# Patient Record
Sex: FEMALE | Race: WHITE | NOT HISPANIC OR LATINO | Employment: FULL TIME | URBAN - METROPOLITAN AREA
[De-identification: names, ages, dates, MRNs, and addresses within clinical notes are randomized per-mention and may not be internally consistent; named-entity substitution may affect disease eponyms.]

---

## 2021-02-26 ENCOUNTER — OFFICE VISIT (OUTPATIENT)
Dept: SURGERY | Facility: CLINIC | Age: 26
End: 2021-02-26
Attending: COLON & RECTAL SURGERY
Payer: COMMERCIAL

## 2021-02-26 ENCOUNTER — TELEPHONE (OUTPATIENT)
Dept: SURGERY | Facility: CLINIC | Age: 26
End: 2021-02-26

## 2021-02-26 VITALS
DIASTOLIC BLOOD PRESSURE: 66 MMHG | BODY MASS INDEX: 31.09 KG/M2 | WEIGHT: 164.69 LBS | HEIGHT: 61 IN | SYSTOLIC BLOOD PRESSURE: 127 MMHG | HEART RATE: 89 BPM

## 2021-02-26 DIAGNOSIS — K51.914 ULCERATIVE COLITIS WITH ABSCESS, UNSPECIFIED LOCATION: Primary | ICD-10-CM

## 2021-02-26 PROCEDURE — 99999 PR PBB SHADOW E&M-NEW PATIENT-LVL III: CPT | Mod: PBBFAC,,, | Performed by: COLON & RECTAL SURGERY

## 2021-02-26 PROCEDURE — 99204 PR OFFICE/OUTPT VISIT, NEW, LEVL IV, 45-59 MIN: ICD-10-PCS | Mod: S$GLB,,, | Performed by: COLON & RECTAL SURGERY

## 2021-02-26 PROCEDURE — 99204 OFFICE O/P NEW MOD 45 MIN: CPT | Mod: S$GLB,,, | Performed by: COLON & RECTAL SURGERY

## 2021-02-26 PROCEDURE — 3008F BODY MASS INDEX DOCD: CPT | Mod: CPTII,S$GLB,, | Performed by: COLON & RECTAL SURGERY

## 2021-02-26 PROCEDURE — 1126F AMNT PAIN NOTED NONE PRSNT: CPT | Mod: S$GLB,,, | Performed by: COLON & RECTAL SURGERY

## 2021-02-26 PROCEDURE — 99999 PR PBB SHADOW E&M-NEW PATIENT-LVL III: ICD-10-PCS | Mod: PBBFAC,,, | Performed by: COLON & RECTAL SURGERY

## 2021-02-26 PROCEDURE — 3008F PR BODY MASS INDEX (BMI) DOCUMENTED: ICD-10-PCS | Mod: CPTII,S$GLB,, | Performed by: COLON & RECTAL SURGERY

## 2021-02-26 PROCEDURE — 1126F PR PAIN SEVERITY QUANTIFIED, NO PAIN PRESENT: ICD-10-PCS | Mod: S$GLB,,, | Performed by: COLON & RECTAL SURGERY

## 2021-02-26 RX ORDER — CITALOPRAM 40 MG/1
40 TABLET, FILM COATED ORAL DAILY
COMMUNITY
End: 2022-11-14 | Stop reason: SDUPTHER

## 2021-02-27 DIAGNOSIS — K51.914 ULCERATIVE COLITIS WITH ABSCESS, UNSPECIFIED LOCATION: Primary | ICD-10-CM

## 2021-03-01 ENCOUNTER — HOSPITAL ENCOUNTER (OUTPATIENT)
Dept: RADIOLOGY | Facility: HOSPITAL | Age: 26
Discharge: HOME | End: 2021-03-01
Attending: COLON & RECTAL SURGERY
Payer: COMMERCIAL

## 2021-03-01 DIAGNOSIS — K51.914 ULCERATIVE COLITIS WITH ABSCESS, UNSPECIFIED LOCATION: ICD-10-CM

## 2021-03-02 ENCOUNTER — OFFICE VISIT (OUTPATIENT)
Dept: WOUND CARE | Facility: CLINIC | Age: 26
End: 2021-03-02
Payer: COMMERCIAL

## 2021-03-02 ENCOUNTER — PATIENT MESSAGE (OUTPATIENT)
Dept: SURGERY | Facility: CLINIC | Age: 26
End: 2021-03-02

## 2021-03-02 DIAGNOSIS — Z71.89 ENCOUNTER FOR OSTOMY CARE EDUCATION: ICD-10-CM

## 2021-03-02 DIAGNOSIS — Z43.2 ATTENTION TO ILEOSTOMY: Primary | ICD-10-CM

## 2021-03-02 PROCEDURE — 99999 PR PBB SHADOW E&M-EST. PATIENT-LVL II: CPT | Mod: PBBFAC,,, | Performed by: CLINICAL NURSE SPECIALIST

## 2021-03-02 PROCEDURE — 99999 PR PBB SHADOW E&M-EST. PATIENT-LVL II: ICD-10-PCS | Mod: PBBFAC,,, | Performed by: CLINICAL NURSE SPECIALIST

## 2021-03-02 PROCEDURE — 99214 OFFICE O/P EST MOD 30 MIN: CPT | Mod: S$GLB,,, | Performed by: CLINICAL NURSE SPECIALIST

## 2021-03-02 PROCEDURE — 99214 PR OFFICE/OUTPT VISIT, EST, LEVL IV, 30-39 MIN: ICD-10-PCS | Mod: S$GLB,,, | Performed by: CLINICAL NURSE SPECIALIST

## 2021-03-13 ENCOUNTER — HOSPITAL ENCOUNTER (OUTPATIENT)
Dept: RADIOLOGY | Facility: HOSPITAL | Age: 26
Discharge: HOME | End: 2021-03-13
Attending: COLON & RECTAL SURGERY
Payer: COMMERCIAL

## 2021-03-13 PROCEDURE — 25500020 PHARM REV CODE 255: Performed by: COLON & RECTAL SURGERY

## 2021-03-13 PROCEDURE — 74177 CT ABD & PELVIS W/CONTRAST: CPT | Mod: 26,,, | Performed by: RADIOLOGY

## 2021-03-13 PROCEDURE — 74177 CT ENTEROGRAPHY ABD_PELVIS WITH CONTRAST: ICD-10-PCS | Mod: 26,,, | Performed by: RADIOLOGY

## 2021-03-13 PROCEDURE — 74177 CT ABD & PELVIS W/CONTRAST: CPT | Mod: TC

## 2021-03-13 RX ADMIN — IOHEXOL 125 ML: 350 INJECTION, SOLUTION INTRAVENOUS at 08:03

## 2021-03-23 ENCOUNTER — TELEPHONE (OUTPATIENT)
Dept: SURGERY | Facility: CLINIC | Age: 26
End: 2021-03-23

## 2021-04-09 ENCOUNTER — OFFICE VISIT (OUTPATIENT)
Dept: SURGERY | Facility: CLINIC | Age: 26
End: 2021-04-09
Attending: COLON & RECTAL SURGERY
Payer: COMMERCIAL

## 2021-04-09 VITALS
HEART RATE: 78 BPM | DIASTOLIC BLOOD PRESSURE: 65 MMHG | WEIGHT: 164 LBS | HEIGHT: 61 IN | SYSTOLIC BLOOD PRESSURE: 104 MMHG | BODY MASS INDEX: 30.96 KG/M2

## 2021-04-09 DIAGNOSIS — K51.914 ULCERATIVE COLITIS WITH ABSCESS, UNSPECIFIED LOCATION: Primary | ICD-10-CM

## 2021-04-09 PROCEDURE — 1126F PR PAIN SEVERITY QUANTIFIED, NO PAIN PRESENT: ICD-10-PCS | Mod: S$GLB,,, | Performed by: COLON & RECTAL SURGERY

## 2021-04-09 PROCEDURE — 99212 OFFICE O/P EST SF 10 MIN: CPT | Mod: S$GLB,,, | Performed by: COLON & RECTAL SURGERY

## 2021-04-09 PROCEDURE — 3008F PR BODY MASS INDEX (BMI) DOCUMENTED: ICD-10-PCS | Mod: CPTII,S$GLB,, | Performed by: COLON & RECTAL SURGERY

## 2021-04-09 PROCEDURE — 99999 PR PBB SHADOW E&M-EST. PATIENT-LVL III: ICD-10-PCS | Mod: PBBFAC,,, | Performed by: COLON & RECTAL SURGERY

## 2021-04-09 PROCEDURE — 3008F BODY MASS INDEX DOCD: CPT | Mod: CPTII,S$GLB,, | Performed by: COLON & RECTAL SURGERY

## 2021-04-09 PROCEDURE — 1126F AMNT PAIN NOTED NONE PRSNT: CPT | Mod: S$GLB,,, | Performed by: COLON & RECTAL SURGERY

## 2021-04-09 PROCEDURE — 99999 PR PBB SHADOW E&M-EST. PATIENT-LVL III: CPT | Mod: PBBFAC,,, | Performed by: COLON & RECTAL SURGERY

## 2021-04-09 PROCEDURE — 99212 PR OFFICE/OUTPT VISIT, EST, LEVL II, 10-19 MIN: ICD-10-PCS | Mod: S$GLB,,, | Performed by: COLON & RECTAL SURGERY

## 2021-04-14 ENCOUNTER — TELEPHONE (OUTPATIENT)
Dept: SURGERY | Facility: CLINIC | Age: 26
End: 2021-04-14

## 2021-04-16 ENCOUNTER — PATIENT MESSAGE (OUTPATIENT)
Dept: RESEARCH | Facility: HOSPITAL | Age: 26
End: 2021-04-16

## 2021-04-26 ENCOUNTER — DOCUMENTATION ONLY (OUTPATIENT)
Dept: SURGERY | Facility: CLINIC | Age: 26
End: 2021-04-26

## 2022-01-25 ENCOUNTER — PATIENT MESSAGE (OUTPATIENT)
Dept: SURGERY | Facility: CLINIC | Age: 27
End: 2022-01-25
Payer: COMMERCIAL

## 2022-01-25 DIAGNOSIS — Z71.3 WEIGHT LOSS COUNSELING, ENCOUNTER FOR: Primary | ICD-10-CM

## 2022-01-26 ENCOUNTER — TELEPHONE (OUTPATIENT)
Dept: BARIATRICS | Facility: CLINIC | Age: 27
End: 2022-01-26
Payer: COMMERCIAL

## 2022-01-26 NOTE — TELEPHONE ENCOUNTER
Phoned patient in reference to the referral received from her colon surgeon.  Left detailed message regarding a FC appointment to verify medical weight loss coverage and get her set up with a bariatrician, both female.

## 2022-01-27 ENCOUNTER — TELEPHONE (OUTPATIENT)
Dept: BARIATRICS | Facility: CLINIC | Age: 27
End: 2022-01-27
Payer: COMMERCIAL

## 2022-01-27 NOTE — TELEPHONE ENCOUNTER
----- Message from Brown Guzman RN sent at 1/25/2022  9:33 AM CST -----  Regarding: Referral  Good morning all,  Would you please schedule patient to see one of your providers? Referral is in: weight loss.   Thank you,    ~Rajesh

## 2022-03-22 ENCOUNTER — OFFICE VISIT (OUTPATIENT)
Dept: DERMATOLOGY | Facility: CLINIC | Age: 27
End: 2022-03-22
Payer: COMMERCIAL

## 2022-03-22 DIAGNOSIS — L03.90 CELLULITIS, UNSPECIFIED CELLULITIS SITE: Primary | ICD-10-CM

## 2022-03-22 PROCEDURE — 1160F PR REVIEW ALL MEDS BY PRESCRIBER/CLIN PHARMACIST DOCUMENTED: ICD-10-PCS | Mod: CPTII,95,, | Performed by: STUDENT IN AN ORGANIZED HEALTH CARE EDUCATION/TRAINING PROGRAM

## 2022-03-22 PROCEDURE — 1159F PR MEDICATION LIST DOCUMENTED IN MEDICAL RECORD: ICD-10-PCS | Mod: CPTII,95,, | Performed by: STUDENT IN AN ORGANIZED HEALTH CARE EDUCATION/TRAINING PROGRAM

## 2022-03-22 PROCEDURE — 1160F RVW MEDS BY RX/DR IN RCRD: CPT | Mod: CPTII,95,, | Performed by: STUDENT IN AN ORGANIZED HEALTH CARE EDUCATION/TRAINING PROGRAM

## 2022-03-22 PROCEDURE — 1159F MED LIST DOCD IN RCRD: CPT | Mod: CPTII,95,, | Performed by: STUDENT IN AN ORGANIZED HEALTH CARE EDUCATION/TRAINING PROGRAM

## 2022-03-22 PROCEDURE — 99203 PR OFFICE/OUTPT VISIT, NEW, LEVL III, 30-44 MIN: ICD-10-PCS | Mod: 95,,, | Performed by: STUDENT IN AN ORGANIZED HEALTH CARE EDUCATION/TRAINING PROGRAM

## 2022-03-22 PROCEDURE — 99203 OFFICE O/P NEW LOW 30 MIN: CPT | Mod: 95,,, | Performed by: STUDENT IN AN ORGANIZED HEALTH CARE EDUCATION/TRAINING PROGRAM

## 2022-03-22 RX ORDER — DOXYCYCLINE HYCLATE 100 MG
100 TABLET ORAL EVERY 12 HOURS
Qty: 20 TABLET | Refills: 0 | Status: SHIPPED | OUTPATIENT
Start: 2022-03-22 | End: 2022-04-01

## 2022-03-22 NOTE — PROGRESS NOTES
Patient Information  Name: Lorelei Rodriguez  : 1995  MRN: [id number]     Referring Physician:  Dr. Hinton   Primary Care Physician:  Dr. Batool Hinton MD   Date of Visit: 2022      Subjective:       Lorelei Rodriguez is a 26 y.o. female who presents for rash    HPI  The patient location is: [place]  The chief complaint leading to consultation is: rash    Visit type: audiovisual    Face to Face time with patient: 10 min  12 minutes of total time spent on the encounter, which includes face to face time and non-face to face time preparing to see the patient (eg, review of tests), Obtaining and/or reviewing separately obtained history, Documenting clinical information in the electronic or other health record, Independently interpreting results (not separately reported) and communicating results to the patient/family/caregiver, or Care coordination (not separately reported).         Each patient to whom he or she provides medical services by telemedicine is:  (1) informed of the relationship between the physician and patient and the respective role of any other health care provider with respect to management of the patient; and (2) notified that he or she may decline to receive medical services by telemedicine and may withdraw from such care at any time.    Notes:   Patient with new complaint of lesion(s)  Location: right leg  Duration: 1 week  Symptoms: raised, warm to touch  Relieving factors/Previous treatments: none    Patient was last seen:Visit date not found     Prior notes by myself reviewed.   Clinical documentation obtained by nursing staff reviewed.    Review of Systems   Constitutional: Negative for fever and chills.   Skin: Positive for rash. Negative for itching.        Objective:    Physical Exam   Constitutional: She appears well-developed and well-nourished. No distress.   Neurological: She is alert and oriented to person, place, and time. She is not disoriented.   Psychiatric: She has a normal mood  and affect.   Skin:   Areas Examined (abnormalities noted in diagram):   RUE Inspected              Diagram Legend     Erythematous scaling macule/papule c/w actinic keratosis       Vascular papule c/w angioma      Pigmented verrucoid papule/plaque c/w seborrheic keratosis      Yellow umbilicated papule c/w sebaceous hyperplasia      Irregularly shaped tan macule c/w lentigo     1-2 mm smooth white papules consistent with Milia      Movable subcutaneous cyst with punctum c/w epidermal inclusion cyst      Subcutaneous movable cyst c/w pilar cyst      Firm pink to brown papule c/w dermatofibroma      Pedunculated fleshy papule(s) c/w skin tag(s)      Evenly pigmented macule c/w junctional nevus     Mildly variegated pigmented, slightly irregular-bordered macule c/w mildly atypical nevus      Flesh colored to evenly pigmented papule c/w intradermal nevus       Pink pearly papule/plaque c/w basal cell carcinoma      Erythematous hyperkeratotic cursted plaque c/w SCC      Surgical scar with no sign of skin cancer recurrence      Open and closed comedones      Inflammatory papules and pustules      Verrucoid papule consistent consistent with wart     Erythematous eczematous patches and plaques     Dystrophic onycholytic nail with subungual debris c/w onychomycosis     Umbilicated papule    Erythematous-base heme-crusted tan verrucoid plaque consistent with inflamed seborrheic keratosis     Erythematous Silvery Scaling Plaque c/w Psoriasis     See annotation            [] Data reviewed  [] Independent review of test  [] Management discussed with another provider    Assessment / Plan:        Cellulitis, unspecified cellulitis site  -     doxycycline (VIBRA-TABS) 100 MG tablet; Take 1 tablet (100 mg total) by mouth every 12 (twelve) hours. for 10 days  Dispense: 20 tablet; Refill: 0  Discussed benefits and risks of doxycyline therapy including but not limited to GI discomfort, esophageal irritation/ulceration, and increased  sun sensitivity. Patient was counseled to take medicine with meals and at least 1 hour before lying down.            LOS NUMBER AND COMPLEXITY OF PROBLEMS    COMPLEXITY OF DATA RISK TOTAL TIME (m)   21786  02764 [] 1 self-limited or minor problem [x] Minimal to none [] No treatment recommended or patient to monitor 15-29  10-19   13129  56201 Low  [] 2 or > self limited or minor problems  [] 1 stable chronic illness  [x] 1 acute, uncomplicated illness or injury Limited (2)  [] Prior external notes from each unique source  [] Review result of each unique test  [] Order each unique test []  Low  OTC medications, minor skin biopsy 30-44  20-29   65166  19854 Moderate  []  1 or > chronic illness with progression, exacerbation or SE of treatment  []  2 or more stable chronic illnesses  []  1 acute illness with systemic symptoms  []  1 acute complicated injury  []  1 undiagnosed new problem with uncertain prognosis Moderate (1/3 below)  []  3 or more data items        *Now includes assessment requiring independent historian  []  Independent interpretation of a test  []  Discuss management/test with another provider Moderate  [x]  Prescription drug mgmt  []  Minor surgery with risk discussed  []  Mgmt limited by social determinates 45-59  30-39   09407  39152 High  []  1 or more chronic illness with severe exacerbation, progression or SE of treatment  []  1 acute or chronic illness/injury that poses a threat to life or bodily function Extensive (2/3 below)  []  3 or more data items        *Now includes assessment requiring independent historian.  []  Independent interpretation of a test  []  Discuss management/test with another provider High  []  Major surgery with risk discussed  []  Drug therapy requiring intensive monitoring for toxicity  []  Hospitalization  []  Decision for DNR 60-74  40-54      No follow-ups on file.    Nadja Ayala MD, FAAD  [facility]

## 2022-04-02 ENCOUNTER — HOSPITAL ENCOUNTER (EMERGENCY)
Facility: HOSPITAL | Age: 27
Discharge: HOME | End: 2022-04-02
Attending: EMERGENCY MEDICINE
Payer: COMMERCIAL

## 2022-04-02 VITALS
TEMPERATURE: 98 F | SYSTOLIC BLOOD PRESSURE: 111 MMHG | WEIGHT: 160 LBS | HEIGHT: 61 IN | BODY MASS INDEX: 30.21 KG/M2 | DIASTOLIC BLOOD PRESSURE: 78 MMHG | RESPIRATION RATE: 18 BRPM | OXYGEN SATURATION: 99 % | HEART RATE: 84 BPM

## 2022-04-02 DIAGNOSIS — L01.00 IMPETIGO: ICD-10-CM

## 2022-04-02 DIAGNOSIS — R21 RASH: Primary | ICD-10-CM

## 2022-04-02 LAB
B-HCG UR QL: NEGATIVE
CTP QC/QA: YES

## 2022-04-02 PROCEDURE — 25000003 PHARM REV CODE 250: Mod: ER | Performed by: EMERGENCY MEDICINE

## 2022-04-02 PROCEDURE — 99283 EMERGENCY DEPT VISIT LOW MDM: CPT | Mod: 25,ER

## 2022-04-02 PROCEDURE — 81025 URINE PREGNANCY TEST: CPT | Mod: ER | Performed by: EMERGENCY MEDICINE

## 2022-04-02 RX ORDER — MUPIROCIN 20 MG/G
OINTMENT TOPICAL 2 TIMES DAILY
Qty: 22 G | Refills: 0 | Status: ON HOLD | OUTPATIENT
Start: 2022-04-02 | End: 2022-04-13 | Stop reason: HOSPADM

## 2022-04-02 RX ORDER — AMOXICILLIN AND CLAVULANATE POTASSIUM 875; 125 MG/1; MG/1
1 TABLET, FILM COATED ORAL 2 TIMES DAILY
Qty: 20 TABLET | Refills: 0 | Status: ON HOLD | OUTPATIENT
Start: 2022-04-02 | End: 2022-04-13 | Stop reason: HOSPADM

## 2022-04-02 RX ORDER — MUPIROCIN 20 MG/G
OINTMENT TOPICAL
Status: COMPLETED | OUTPATIENT
Start: 2022-04-02 | End: 2022-04-02

## 2022-04-02 RX ORDER — KETOROLAC TROMETHAMINE 10 MG/1
10 TABLET, FILM COATED ORAL EVERY 6 HOURS PRN
Qty: 12 TABLET | Refills: 0 | Status: SHIPPED | OUTPATIENT
Start: 2022-04-02 | End: 2022-04-05

## 2022-04-02 RX ADMIN — MUPIROCIN: 20 OINTMENT TOPICAL at 01:04

## 2022-04-02 NOTE — ED PROVIDER NOTES
Encounter Date: 4/2/2022       History     Chief Complaint   Patient presents with    Skin Problem     PT WITH RED SWOLLEN PAINFUL AREA TO RIGHT SHIN FOR 1 WEEK, REPORTS HAD VIRTUAL VISIT WITH DERMATOLOGIST LAST Wednesday AND PLACED ON ABX, REPORTS NO BETTER     26 y.o. female with ulcerative colitis (status post ileostomy), pyoderma gangrenosa presents emergency department complaining of acute redness, swelling pain to right anterior shin that began a week ago. She states symptoms are similar to previous episodes of pyoderma gangrenosa. She denies fever, SOB, CP or gait disturbance. She reports seeing dermatologist last week (11 days ago according to chart review) and was prescribed 10 day course of doxycycline. She states area increasing in size since and becoming more painful starting antibiotics and has 4 days of antibiotics remaining as of today. She reports shaving her legs 2-3 days ago. States she was told to follow up with dermatologist if symptoms do not improve, but she has not notified her dermatologist of her worsening symptoms yet.     The history is provided by the patient.     Review of patient's allergies indicates:   Allergen Reactions    Nickel sutures [surgical stainless steel]     Nickel Rash     Past Medical History:   Diagnosis Date    Pyoderma gangrenosa     UC (ulcerative colitis)      Past Surgical History:   Procedure Laterality Date    ILEOSTOMY      SINUS SURGERY       No family history on file.  Social History     Tobacco Use    Smoking status: Never Smoker    Smokeless tobacco: Never Used   Substance Use Topics    Alcohol use: Yes     Comment: RARELY    Drug use: Never     Review of Systems   Constitutional: Negative for fever.   HENT: Negative for trouble swallowing.    Respiratory: Negative for shortness of breath.    Cardiovascular: Negative for chest pain and leg swelling.   Gastrointestinal: Negative for nausea and vomiting.   Skin: Positive for rash.   Neurological:  Negative for weakness and numbness.   All other systems reviewed and are negative.      Physical Exam     Initial Vitals [22 0040]   BP Pulse Resp Temp SpO2   111/78 81 20 98.3 °F (36.8 °C) 100 %      MAP       --         Physical Exam    Nursing note and vitals reviewed.  Constitutional: She appears well-developed and well-nourished. She is not diaphoretic. No distress.   HENT:   Head: Normocephalic and atraumatic.   Eyes: Conjunctivae are normal. Pupils are equal, round, and reactive to light.   Neck: Neck supple.   Normal range of motion.  Cardiovascular: Normal rate and intact distal pulses.   Pulmonary/Chest: No accessory muscle usage. No tachypnea. No respiratory distress.   Abdominal: She exhibits no distension. There is no abdominal tenderness.   Musculoskeletal:         General: No tenderness. Normal range of motion.      Cervical back: Normal range of motion and neck supple.     Neurological: She is alert and oriented to person, place, and time. She has normal strength. Gait normal. GCS eye subscore is 4. GCS verbal subscore is 5. GCS motor subscore is 6.   Skin: Skin is warm. Capillary refill takes less than 2 seconds. Rash noted.   Psychiatric: She has a normal mood and affect.             ED Course   Procedures  Labs Reviewed   POCT URINE PREGNANCY          Imaging Results    None          Medications   mupirocin 2 % ointment ( Topical (Top) Given 22)                          Clinical Impression:   Final diagnoses:  [R21] Rash (Primary)  [L01.00] Impetigo          ED Disposition Condition    Discharge Stable        ED Prescriptions     Medication Sig Dispense Start Date End Date Auth. Provider    amoxicillin-clavulanate 875-125mg (AUGMENTIN) 875-125 mg per tablet () Take 1 tablet by mouth 2 (two) times daily. for 10 days 20 tablet 2022 Mk Bonner MD    mupirocin (BACTROBAN) 2 % ointment () Apply topically 2 (two) times daily. for 10 days 22 g 2022  2022 Mk Bonner MD    ketorolac (TORADOL) 10 mg tablet () Take 1 tablet (10 mg total) by mouth every 6 (six) hours as needed for Pain (take with food). 12 tablet 2022 Mk Bonner MD        Follow-up Information     Follow up With Specialties Details Why Contact Info    Batool Hinton MD Internal Medicine Call  Monday morning, to schedule an appointment, for re-evaluation of today's complaint, and ongoing care [address] [place] [id number]  [phone]      The nearest emergency department.  Go to  As needed, If symptoms worsen            Mk Bonner MD  22

## 2022-04-07 ENCOUNTER — HOSPITAL ENCOUNTER (INPATIENT)
Facility: HOSPITAL | Age: 27
LOS: 7 days | Discharge: HOME | DRG: 871 | End: 2022-04-14
Attending: INTERNAL MEDICINE | Admitting: INTERNAL MEDICINE
Payer: COMMERCIAL

## 2022-04-07 DIAGNOSIS — L88 PYODERMA GANGRENOSUM: Chronic | ICD-10-CM

## 2022-04-07 DIAGNOSIS — Z16.20 THERAPY FAILURE DUE TO ANTIBIOTIC RESISTANCE: ICD-10-CM

## 2022-04-07 DIAGNOSIS — K51.918 ULCERATIVE COLITIS WITH OTHER COMPLICATION, UNSPECIFIED LOCATION: ICD-10-CM

## 2022-04-07 DIAGNOSIS — A41.9 SEPSIS, DUE TO UNSPECIFIED ORGANISM, UNSPECIFIED WHETHER ACUTE ORGAN DYSFUNCTION PRESENT: ICD-10-CM

## 2022-04-07 DIAGNOSIS — K51.818 OTHER ULCERATIVE COLITIS WITH OTHER COMPLICATION: ICD-10-CM

## 2022-04-07 DIAGNOSIS — L03.115 CELLULITIS OF RIGHT LEG: Primary | ICD-10-CM

## 2022-04-07 PROBLEM — R65.21 SEPTIC SHOCK: Status: ACTIVE | Noted: 2022-04-07

## 2022-04-07 PROBLEM — K51.90 ULCERATIVE COLITIS: Status: ACTIVE | Noted: 2018-11-28

## 2022-04-07 LAB
ALBUMIN SERPL-MCNC: 3.2 G/DL (ref 3.3–5.5)
ALLENS TEST: ABNORMAL
ALP SERPL-CCNC: 81 U/L (ref 42–141)
BILIRUB SERPL-MCNC: 0.8 MG/DL (ref 0.2–1.6)
BUN SERPL-MCNC: 10 MG/DL (ref 7–22)
CALCIUM SERPL-MCNC: 9.4 MG/DL (ref 8–10.3)
CHLORIDE SERPL-SCNC: 104 MMOL/L (ref 98–108)
CREAT SERPL-MCNC: 0.6 MG/DL (ref 0.6–1.2)
CTP QC/QA: YES
GLUCOSE SERPL-MCNC: 99 MG/DL (ref 73–118)
HCO3 UR-SCNC: 24.4 MMOL/L (ref 24–28)
LDH SERPL L TO P-CCNC: 2.51 MMOL/L (ref 0.5–2.2)
PCO2 BLDA: 46.7 MMHG (ref 35–45)
PH SMN: 7.33 [PH] (ref 7.35–7.45)
PO2 BLDA: 45 MMHG (ref 40–60)
POC ALT (SGPT): 19 U/L (ref 10–47)
POC AST (SGOT): 29 U/L (ref 11–38)
POC BE: -2 MMOL/L
POC SATURATED O2: 77 % (ref 95–100)
POC TCO2: 25 MMOL/L (ref 18–33)
POC TCO2: 26 MMOL/L (ref 24–29)
POTASSIUM BLD-SCNC: 3.7 MMOL/L (ref 3.6–5.1)
PROTEIN, POC: 7.7 G/DL (ref 6.4–8.1)
SAMPLE: ABNORMAL
SARS-COV-2 RDRP RESP QL NAA+PROBE: NEGATIVE
SITE: ABNORMAL
SODIUM BLD-SCNC: 138 MMOL/L (ref 128–145)

## 2022-04-07 PROCEDURE — 96366 THER/PROPH/DIAG IV INF ADDON: CPT | Mod: ER

## 2022-04-07 PROCEDURE — U0002 COVID-19 LAB TEST NON-CDC: HCPCS | Mod: ER | Performed by: INTERNAL MEDICINE

## 2022-04-07 PROCEDURE — 80053 COMPREHEN METABOLIC PANEL: CPT | Mod: ER

## 2022-04-07 PROCEDURE — 82803 BLOOD GASES ANY COMBINATION: CPT | Mod: ER

## 2022-04-07 PROCEDURE — 85025 COMPLETE CBC W/AUTO DIFF WBC: CPT | Mod: ER

## 2022-04-07 PROCEDURE — 96365 THER/PROPH/DIAG IV INF INIT: CPT | Mod: ER

## 2022-04-07 PROCEDURE — 20000000 HC ICU ROOM

## 2022-04-07 PROCEDURE — 99291 CRITICAL CARE FIRST HOUR: CPT | Mod: 25,ER

## 2022-04-07 PROCEDURE — 87040 BLOOD CULTURE FOR BACTERIA: CPT | Performed by: INTERNAL MEDICINE

## 2022-04-07 PROCEDURE — 96367 TX/PROPH/DG ADDL SEQ IV INF: CPT | Mod: ER

## 2022-04-07 PROCEDURE — 25000003 PHARM REV CODE 250: Mod: ER | Performed by: INTERNAL MEDICINE

## 2022-04-07 PROCEDURE — 63600175 PHARM REV CODE 636 W HCPCS: Mod: ER | Performed by: INTERNAL MEDICINE

## 2022-04-07 RX ORDER — IBUPROFEN 600 MG/1
600 TABLET ORAL
Status: COMPLETED | OUTPATIENT
Start: 2022-04-07 | End: 2022-04-07

## 2022-04-07 RX ORDER — VANCOMYCIN HCL IN 5 % DEXTROSE 1G/250ML
1000 PLASTIC BAG, INJECTION (ML) INTRAVENOUS
Status: COMPLETED | OUTPATIENT
Start: 2022-04-07 | End: 2022-04-07

## 2022-04-07 RX ORDER — SODIUM CHLORIDE 9 MG/ML
1000 INJECTION, SOLUTION INTRAVENOUS ONCE
Status: COMPLETED | OUTPATIENT
Start: 2022-04-07 | End: 2022-04-07

## 2022-04-07 RX ORDER — ACETAMINOPHEN 500 MG
1000 TABLET ORAL
Status: COMPLETED | OUTPATIENT
Start: 2022-04-07 | End: 2022-04-07

## 2022-04-07 RX ADMIN — SODIUM CHLORIDE 1000 ML: 0.9 INJECTION, SOLUTION INTRAVENOUS at 09:04

## 2022-04-07 RX ADMIN — VANCOMYCIN HYDROCHLORIDE 1000 MG: 1 INJECTION, POWDER, LYOPHILIZED, FOR SOLUTION INTRAVENOUS at 08:04

## 2022-04-07 RX ADMIN — SODIUM CHLORIDE 1000 ML: 0.9 INJECTION, SOLUTION INTRAVENOUS at 10:04

## 2022-04-07 RX ADMIN — ACETAMINOPHEN 1000 MG: 500 TABLET ORAL at 07:04

## 2022-04-07 RX ADMIN — IBUPROFEN 600 MG: 600 TABLET ORAL at 08:04

## 2022-04-07 RX ADMIN — SODIUM CHLORIDE 1000 ML: 0.9 INJECTION, SOLUTION INTRAVENOUS at 08:04

## 2022-04-07 RX ADMIN — PIPERACILLIN AND TAZOBACTAM 4.5 G: 4; .5 INJECTION, POWDER, LYOPHILIZED, FOR SOLUTION INTRAVENOUS; PARENTERAL at 08:04

## 2022-04-07 NOTE — Clinical Note
Diagnosis: Cellulitis of right leg [253976]   Admitting Provider:: ANKITA RINALDI [[id number]]   Future Attending Provider: JENN RICHMOND [[id number]]   Reason for IP Medical Treatment  (Clinical interventions that can only be accomplished in the IP setting? ) :: Further treatment for right lower extremity cellulitis   Estimated Length of Stay:: 2 midnights   I certify that Inpatient services for greater than or equal to 2 midnights are medically necessary:: Yes   Plans for Post-Acute care--if anticipated (pick the single best option):: A. No post acute care anticipated at this time   Special Needs:: No Special Needs [1]

## 2022-04-08 LAB
ALBUMIN SERPL BCP-MCNC: 2.5 G/DL (ref 3.5–5.2)
ALLENS TEST: ABNORMAL
ALP SERPL-CCNC: 63 U/L (ref 55–135)
ALT SERPL W/O P-5'-P-CCNC: 17 U/L (ref 10–44)
ANION GAP SERPL CALC-SCNC: 6 MMOL/L (ref 8–16)
AST SERPL-CCNC: 17 U/L (ref 10–40)
BASOPHILS # BLD AUTO: 0.06 K/UL (ref 0–0.2)
BASOPHILS NFR BLD: 0.5 % (ref 0–1.9)
BILIRUB SERPL-MCNC: 1 MG/DL (ref 0.1–1)
BUN SERPL-MCNC: 6 MG/DL (ref 6–20)
CALCIUM SERPL-MCNC: 7.8 MG/DL (ref 8.7–10.5)
CHLORIDE SERPL-SCNC: 113 MMOL/L (ref 95–110)
CO2 SERPL-SCNC: 18 MMOL/L (ref 23–29)
CREAT SERPL-MCNC: 0.6 MG/DL (ref 0.5–1.4)
DIFFERENTIAL METHOD: ABNORMAL
EOSINOPHIL # BLD AUTO: 0.5 K/UL (ref 0–0.5)
EOSINOPHIL NFR BLD: 3.9 % (ref 0–8)
ERYTHROCYTE [DISTWIDTH] IN BLOOD BY AUTOMATED COUNT: 12.5 % (ref 11.5–14.5)
EST. GFR  (AFRICAN AMERICAN): >60 ML/MIN/1.73 M^2
EST. GFR  (NON AFRICAN AMERICAN): >60 ML/MIN/1.73 M^2
GLUCOSE SERPL-MCNC: 85 MG/DL (ref 70–110)
HCO3 UR-SCNC: 19.3 MMOL/L (ref 24–28)
HCT VFR BLD AUTO: 38.1 % (ref 37–48.5)
HGB BLD-MCNC: 12.4 G/DL (ref 12–16)
IMM GRANULOCYTES # BLD AUTO: 0.03 K/UL (ref 0–0.04)
IMM GRANULOCYTES NFR BLD AUTO: 0.3 % (ref 0–0.5)
LACTATE SERPL-SCNC: 0.9 MMOL/L (ref 0.5–2.2)
LDH SERPL L TO P-CCNC: 1.22 MMOL/L (ref 0.5–2.2)
LYMPHOCYTES # BLD AUTO: 1.9 K/UL (ref 1–4.8)
LYMPHOCYTES NFR BLD: 16.4 % (ref 18–48)
MCH RBC QN AUTO: 27.7 PG (ref 27–31)
MCHC RBC AUTO-ENTMCNC: 32.5 G/DL (ref 32–36)
MCV RBC AUTO: 85 FL (ref 82–98)
MONOCYTES # BLD AUTO: 1.2 K/UL (ref 0.3–1)
MONOCYTES NFR BLD: 10.1 % (ref 4–15)
NEUTROPHILS # BLD AUTO: 8.2 K/UL (ref 1.8–7.7)
NEUTROPHILS NFR BLD: 68.8 % (ref 38–73)
NRBC BLD-RTO: 0 /100 WBC
PCO2 BLDA: 33.3 MMHG (ref 35–45)
PH SMN: 7.37 [PH] (ref 7.35–7.45)
PLATELET # BLD AUTO: 247 K/UL (ref 150–450)
PMV BLD AUTO: 9.5 FL (ref 9.2–12.9)
PO2 BLDA: 82 MMHG (ref 40–60)
POC BE: -5 MMOL/L
POC SATURATED O2: 96 % (ref 95–100)
POC TCO2: 20 MMOL/L (ref 24–29)
POTASSIUM SERPL-SCNC: 3.9 MMOL/L (ref 3.5–5.1)
PROT SERPL-MCNC: 5.8 G/DL (ref 6–8.4)
RBC # BLD AUTO: 4.48 M/UL (ref 4–5.4)
SAMPLE: ABNORMAL
SITE: ABNORMAL
SODIUM SERPL-SCNC: 137 MMOL/L (ref 136–145)
WBC # BLD AUTO: 11.84 K/UL (ref 3.9–12.7)

## 2022-04-08 PROCEDURE — 99222 PR INITIAL HOSPITAL CARE,LEVL II: ICD-10-PCS | Mod: ,,, | Performed by: INTERNAL MEDICINE

## 2022-04-08 PROCEDURE — 25000003 PHARM REV CODE 250: Performed by: INTERNAL MEDICINE

## 2022-04-08 PROCEDURE — 11000001 HC ACUTE MED/SURG PRIVATE ROOM

## 2022-04-08 PROCEDURE — 99222 1ST HOSP IP/OBS MODERATE 55: CPT | Mod: ,,, | Performed by: INTERNAL MEDICINE

## 2022-04-08 PROCEDURE — 36415 COLL VENOUS BLD VENIPUNCTURE: CPT | Performed by: INTERNAL MEDICINE

## 2022-04-08 PROCEDURE — 63600175 PHARM REV CODE 636 W HCPCS: Performed by: INTERNAL MEDICINE

## 2022-04-08 PROCEDURE — 86706 HEP B SURFACE ANTIBODY: CPT | Performed by: INTERNAL MEDICINE

## 2022-04-08 PROCEDURE — 86704 HEP B CORE ANTIBODY TOTAL: CPT | Performed by: INTERNAL MEDICINE

## 2022-04-08 PROCEDURE — 86790 VIRUS ANTIBODY NOS: CPT | Performed by: INTERNAL MEDICINE

## 2022-04-08 PROCEDURE — 86480 TB TEST CELL IMMUN MEASURE: CPT | Performed by: INTERNAL MEDICINE

## 2022-04-08 PROCEDURE — 82803 BLOOD GASES ANY COMBINATION: CPT | Mod: ER

## 2022-04-08 PROCEDURE — 86803 HEPATITIS C AB TEST: CPT | Performed by: INTERNAL MEDICINE

## 2022-04-08 PROCEDURE — 87340 HEPATITIS B SURFACE AG IA: CPT | Performed by: INTERNAL MEDICINE

## 2022-04-08 PROCEDURE — 80053 COMPREHEN METABOLIC PANEL: CPT | Performed by: INTERNAL MEDICINE

## 2022-04-08 PROCEDURE — 85025 COMPLETE CBC W/AUTO DIFF WBC: CPT | Performed by: INTERNAL MEDICINE

## 2022-04-08 PROCEDURE — 83605 ASSAY OF LACTIC ACID: CPT | Performed by: INTERNAL MEDICINE

## 2022-04-08 RX ORDER — DIPHENHYDRAMINE HCL 25 MG
25 CAPSULE ORAL EVERY 6 HOURS PRN
Status: DISCONTINUED | OUTPATIENT
Start: 2022-04-08 | End: 2022-04-14 | Stop reason: HOSPADM

## 2022-04-08 RX ORDER — MORPHINE SULFATE 4 MG/ML
4 INJECTION, SOLUTION INTRAMUSCULAR; INTRAVENOUS EVERY 6 HOURS PRN
Status: DISCONTINUED | OUTPATIENT
Start: 2022-04-08 | End: 2022-04-14 | Stop reason: HOSPADM

## 2022-04-08 RX ORDER — PROCHLORPERAZINE EDISYLATE 5 MG/ML
5 INJECTION INTRAMUSCULAR; INTRAVENOUS EVERY 6 HOURS PRN
Status: DISCONTINUED | OUTPATIENT
Start: 2022-04-08 | End: 2022-04-14 | Stop reason: HOSPADM

## 2022-04-08 RX ORDER — NOREPINEPHRINE BITARTRATE/D5W 4MG/250ML
0-.2 PLASTIC BAG, INJECTION (ML) INTRAVENOUS CONTINUOUS
Status: DISCONTINUED | OUTPATIENT
Start: 2022-04-08 | End: 2022-04-08

## 2022-04-08 RX ORDER — ACETAMINOPHEN 500 MG
500 TABLET ORAL EVERY 6 HOURS PRN
Status: DISCONTINUED | OUTPATIENT
Start: 2022-04-08 | End: 2022-04-08

## 2022-04-08 RX ORDER — TALC
6 POWDER (GRAM) TOPICAL NIGHTLY PRN
Status: DISCONTINUED | OUTPATIENT
Start: 2022-04-08 | End: 2022-04-14 | Stop reason: HOSPADM

## 2022-04-08 RX ORDER — CALCIUM CARBONATE 200(500)MG
500 TABLET,CHEWABLE ORAL DAILY PRN
Status: DISCONTINUED | OUTPATIENT
Start: 2022-04-08 | End: 2022-04-14 | Stop reason: HOSPADM

## 2022-04-08 RX ORDER — HYDROCODONE BITARTRATE AND ACETAMINOPHEN 5; 325 MG/1; MG/1
1 TABLET ORAL EVERY 4 HOURS PRN
Status: DISCONTINUED | OUTPATIENT
Start: 2022-04-08 | End: 2022-04-14 | Stop reason: HOSPADM

## 2022-04-08 RX ORDER — IBUPROFEN 400 MG/1
400 TABLET ORAL ONCE
Status: COMPLETED | OUTPATIENT
Start: 2022-04-08 | End: 2022-04-08

## 2022-04-08 RX ORDER — CITALOPRAM 20 MG/1
40 TABLET, FILM COATED ORAL DAILY
Status: DISCONTINUED | OUTPATIENT
Start: 2022-04-08 | End: 2022-04-14 | Stop reason: HOSPADM

## 2022-04-08 RX ORDER — SIMETHICONE 80 MG
1 TABLET,CHEWABLE ORAL 3 TIMES DAILY PRN
Status: DISCONTINUED | OUTPATIENT
Start: 2022-04-08 | End: 2022-04-14 | Stop reason: HOSPADM

## 2022-04-08 RX ORDER — CLONIDINE HYDROCHLORIDE 0.1 MG/1
0.1 TABLET ORAL 3 TIMES DAILY PRN
Status: DISCONTINUED | OUTPATIENT
Start: 2022-04-08 | End: 2022-04-09

## 2022-04-08 RX ORDER — ENOXAPARIN SODIUM 100 MG/ML
40 INJECTION SUBCUTANEOUS EVERY 24 HOURS
Status: DISCONTINUED | OUTPATIENT
Start: 2022-04-08 | End: 2022-04-08

## 2022-04-08 RX ORDER — MUPIROCIN 20 MG/G
OINTMENT TOPICAL 2 TIMES DAILY
Status: DISPENSED | OUTPATIENT
Start: 2022-04-08 | End: 2022-04-13

## 2022-04-08 RX ORDER — SODIUM CHLORIDE 9 MG/ML
INJECTION, SOLUTION INTRAVENOUS CONTINUOUS
Status: DISCONTINUED | OUTPATIENT
Start: 2022-04-08 | End: 2022-04-09

## 2022-04-08 RX ORDER — SENNOSIDES 8.6 MG/1
8.6 TABLET ORAL DAILY PRN
Status: DISCONTINUED | OUTPATIENT
Start: 2022-04-08 | End: 2022-04-14 | Stop reason: HOSPADM

## 2022-04-08 RX ADMIN — IBUPROFEN 400 MG: 400 TABLET ORAL at 05:04

## 2022-04-08 RX ADMIN — Medication 6 MG: at 10:04

## 2022-04-08 RX ADMIN — VANCOMYCIN HYDROCHLORIDE 1250 MG: 1.25 INJECTION, POWDER, LYOPHILIZED, FOR SOLUTION INTRAVENOUS at 09:04

## 2022-04-08 RX ADMIN — PIPERACILLIN AND TAZOBACTAM 4.5 G: 4; .5 INJECTION, POWDER, LYOPHILIZED, FOR SOLUTION INTRAVENOUS; PARENTERAL at 04:04

## 2022-04-08 RX ADMIN — HYDROCODONE BITARTRATE AND ACETAMINOPHEN 1 TABLET: 5; 325 TABLET ORAL at 10:04

## 2022-04-08 RX ADMIN — SODIUM CHLORIDE: 0.9 INJECTION, SOLUTION INTRAVENOUS at 12:04

## 2022-04-08 RX ADMIN — PIPERACILLIN SODIUM AND TAZOBACTAM SODIUM 4.5 G: 4; .5 INJECTION, POWDER, FOR SOLUTION INTRAVENOUS at 08:04

## 2022-04-08 RX ADMIN — SODIUM CHLORIDE: 0.9 INJECTION, SOLUTION INTRAVENOUS at 09:04

## 2022-04-08 RX ADMIN — SODIUM CHLORIDE, SODIUM LACTATE, POTASSIUM CHLORIDE, AND CALCIUM CHLORIDE 1000 ML: .6; .31; .03; .02 INJECTION, SOLUTION INTRAVENOUS at 02:04

## 2022-04-08 RX ADMIN — MORPHINE SULFATE 4 MG: 4 INJECTION INTRAVENOUS at 04:04

## 2022-04-08 RX ADMIN — SODIUM CHLORIDE: 0.9 INJECTION, SOLUTION INTRAVENOUS at 08:04

## 2022-04-08 RX ADMIN — MUPIROCIN: 20 OINTMENT TOPICAL at 08:04

## 2022-04-08 RX ADMIN — MORPHINE SULFATE 4 MG: 4 INJECTION INTRAVENOUS at 10:04

## 2022-04-08 RX ADMIN — PIPERACILLIN AND TAZOBACTAM 4.5 G: 4; .5 INJECTION, POWDER, LYOPHILIZED, FOR SOLUTION INTRAVENOUS; PARENTERAL at 12:04

## 2022-04-08 RX ADMIN — CITALOPRAM HYDROBROMIDE 40 MG: 20 TABLET ORAL at 08:04

## 2022-04-08 RX ADMIN — ACETAMINOPHEN 500 MG: 500 TABLET ORAL at 02:04

## 2022-04-08 NOTE — CARE UPDATE
I personally evaluated Ms Rodriguez today. Presents with SIRS criteria concerning for sepsis. Admitted to ICU for low blood pressure but this resolved with fluids therefore no vasopressor support started. Currently afebrile and stable. Is on empiric broad spectrum antibiotics. Blood cultures so far no growth. I do agree with admitting physician that this could be pyoderma gangrenosum. Patient states she's had this before on her right forearm and left leg. Scars are apparent. Is on no medical treatment for ulcerative colitis but denies abdominal pain, diarrhea or blood in stool. Denied trauma to her right leg. She has a large ulcerated wound surrounded by erythema and what looks like bruising. This progressed from a small erythematous area (please see picture from virtual derm clinic visit on 3/22) to current ulceration in just 2-3 weeks despite outpatient doxycycline and augmentin. Will add CRP/ESR to AM labs. I called Dermatology clinic on the [facility] but state they no longer do inpatient consults. This lesion needs to be formally evaluated by Dermatology. I will consult GI in the meantime. Will call [facility] to request a transfer to [facility] for dermatology consultation. Start PRN pain meds. Ok for step down to floor. Discussed with patient and patient's mother at bedside.     Addendum: transferred to floor in stable condition. Has been accepted for transfer to [facility] for Dermatology consultation. Awaiting bed available. Patient and patient's mother updated. Will hold lovenox in anticipation for biopsy.

## 2022-04-08 NOTE — ED NOTES
Pt placed in trendelenburg position per md. Pt remains aaox4 and does not appear to be in distress.

## 2022-04-08 NOTE — NURSING
EICU notified of patient crying and complaining of severe pain to right leg 10/10. Right leg is redden in shin area with tenderness, oozing and swollen.  Elevated leg on pillow.

## 2022-04-08 NOTE — ED NOTES
SPOKE WITH SAM AT TRANSFER CENTER INFORMED HER THAT PT NEEDS TO BE A STAT TRANSFER DUE TO A DECLINE IN HER CONDITION AND ABNORMAL VITAL SIGNS.  PT FLOW CENTER/ [facility] WAS NOTIFIED.

## 2022-04-08 NOTE — NURSING
Patient admitted from ICU to room 418. Patient's vital signs obtained, documented, and are stable. IV antibiotics infusing. Family at bedside. Agree with transferring RN's shift assessment. Will continue to monitor.

## 2022-04-08 NOTE — NURSING
Patient admitted on unit, arrived with EMS from [facility]. Patient alert,and oriented. Shin area on right leg, redden, swollen, small blisters, oozing serousang drainage. Leg tender to touch, elevated on pillow.

## 2022-04-08 NOTE — HPI
Lorelei Rodriguez is a 26 y.o. female with ulcerative colitis who presented initially to [facility] ED with complaints of right leg wound for the past two weeks.  She reports that the wound initially started as a red, raised lesion for which she had a telemedicine visit with her dermatologist on Mar 22, 2022.  She was prescribed a course of doxycycline with which she was compliant.  Unfortunately, it seemed to worsen and she presented to the ED on Apr 2, 2022 and was prescribed a course of amoxicillin/clavulanate.  She also felt that there was no improvement and since that the lesion started to bleed a couple days ago.  She denies any fevers other than today which was 100.5° F. She denies any trauma to her right lower extremity.  She does volunteer that this lesion looks similar to two lesions on her right forearm that was due to pyoderma gangrenosum.  She has otherwise been in her usual state of health.

## 2022-04-08 NOTE — ED NOTES
Pt presents with wound to right anterior leg x 2 days. States she was seen here 2 nights ago and given rx for abx po but symptoms have worsened. Pt has pain, redness and swelling to RLE. Pt reports wound is now draining. Red/clear drainage noted at this time. Pt noted to be febrile upon arrival and denies taking antipyretic.

## 2022-04-08 NOTE — NURSING
Report called to [facility] Rod. Pt being transferred to [facility] via ambulance to room 624. Drsg to R leg changed.

## 2022-04-08 NOTE — SUBJECTIVE & OBJECTIVE
Past Medical History:   Diagnosis Date    Pyoderma gangrenosa     UC (ulcerative colitis)        Past Surgical History:   Procedure Laterality Date    ILEOSTOMY      SINUS SURGERY         Review of patient's allergies indicates:   Allergen Reactions    Nickel sutures [surgical stainless steel]     Nickel Rash       No current facility-administered medications on file prior to encounter.     Current Outpatient Medications on File Prior to Encounter   Medication Sig    amoxicillin-clavulanate 875-125mg (AUGMENTIN) 875-125 mg per tablet Take 1 tablet by mouth 2 (two) times daily. for 10 days    citalopram (CELEXA) 40 MG tablet Take 40 mg by mouth once daily.    mupirocin (BACTROBAN) 2 % ointment Apply topically 2 (two) times daily. for 10 days     Family History    Reviewed and noncontributory        Tobacco Use    Smoking status: Never Smoker    Smokeless tobacco: Never Used   Substance and Sexual Activity    Alcohol use: Yes     Comment: RARELY    Drug use: Never    Sexual activity: Not on file     Review of Systems   Constitutional:  Positive for activity change.   Respiratory:  Negative for shortness of breath.    Cardiovascular:  Negative for chest pain.   Gastrointestinal:  Negative for blood in stool, nausea and vomiting.   Genitourinary:  Negative for dysuria and hematuria.   Skin:  Positive for rash (Right anterior shin).   All other systems reviewed and are negative.  Objective:     Vital Signs (Most Recent):  Temp: 98.4 °F (36.9 °C) (04/08/22 0050)  Pulse: 80 (04/08/22 0100)  Resp: 17 (04/08/22 0100)  BP: (!) 93/59 (04/08/22 0100)  SpO2: 97 % (04/08/22 0100)   Vital Signs (24h Range):  Temp:  [97.7 °F (36.5 °C)-100.5 °F (38.1 °C)] 98.4 °F (36.9 °C)  Pulse:  [] 80  Resp:  [16-48] 17  SpO2:  [97 %-100 %] 97 %  BP: ()/(42-72) 93/59     Weight: 78.4 kg (172 lb 13.5 oz)  Body mass index is 32.66 kg/m².    Physical Exam  Vitals and nursing note reviewed.   Constitutional:       General:  She is not in acute distress.     Appearance: Normal appearance. She is normal weight. She is not ill-appearing or toxic-appearing.   HENT:      Head: Normocephalic and atraumatic.      Right Ear: External ear normal.      Left Ear: External ear normal.      Nose: Nose normal.   Cardiovascular:      Rate and Rhythm: Normal rate and regular rhythm.      Pulses: Normal pulses.      Heart sounds: Normal heart sounds. No murmur heard.    No friction rub. No gallop.   Pulmonary:      Effort: Pulmonary effort is normal. No respiratory distress.      Breath sounds: Normal breath sounds. No wheezing, rhonchi or rales.   Abdominal:      General: Abdomen is flat. Bowel sounds are normal. There is no distension.      Palpations: Abdomen is soft.      Tenderness: There is no abdominal tenderness. There is no guarding or rebound.   Musculoskeletal:         General: No swelling. Normal range of motion.   Skin:     Comments: There is a large lesion to the anterior shin with some bloody drainage   Neurological:      General: No focal deficit present.      Mental Status: She is alert and oriented to person, place, and time. Mental status is at baseline.   Psychiatric:         Mood and Affect: Mood normal.         Behavior: Behavior normal.         Thought Content: Thought content normal.         Judgment: Judgment normal.         Significant Labs: All pertinent labs within the past 24 hours have been reviewed.    Significant Imaging: I have reviewed all pertinent imaging results/findings within the past 24 hours.

## 2022-04-08 NOTE — EICU
26 year old female with sepsis,shock due to right leg wound.Pyoderma gangrenosum suspected.    Past history of ulcerative colitis.    On camera assessment   WA 77,Spo2 98,BP 88/60,RR19    Plan:  Fluid boluses as needed ,pancultures,continue vancomycin and Pip/tazobactam and deescalate with culture results,may need systemic steroids

## 2022-04-08 NOTE — NURSING
Attempted to call report to JANA Alegria. Nurse said she was in a patient's room and will call back.

## 2022-04-08 NOTE — ED PROVIDER NOTES
Encounter Date: 4/7/2022    SCRIBE #1 NOTE: I, Didi Corrales, am scribing for, and in the presence of,  Varghese Cordoba MD. I have scribed the following portions of the note - Other sections scribed: HPI, ROS, PE.       History     Chief Complaint   Patient presents with    Cellulitis     Reports right leg swelling and redness. States she was seen in ED recently and symptoms have worsened despite abx treatment.      Lorelei Rodriguez is a 26 y.o. female who presents to the ED for evaluation of right leg redness and drainage onset 1.5 weeks ago.  She states she was seen in clinic and initially, diagnosed with cellulitis and given a prescription for doxycycline.  She was seen for follow-up clinic visit and switched to Augmentin.  She states pain in the right leg is worse and cellulitis does not appear to be improved.  She also states she has experienced increased drainage from her right leg infection, despite taking antibiotics as prescribed.  Pt reports subjective fever.  She has a history of ulcerative colitis, status post total colectomy in 2017 with ileostomy.    The history is provided by the patient. No  was used.     Review of patient's allergies indicates:   Allergen Reactions    Nickel sutures [surgical stainless steel]     Nickel Rash     Past Medical History:   Diagnosis Date    Pyoderma gangrenosa     UC (ulcerative colitis)      Past Surgical History:   Procedure Laterality Date    ILEOSTOMY      SINUS SURGERY       No family history on file.  Social History     Tobacco Use    Smoking status: Never Smoker    Smokeless tobacco: Never Used   Substance Use Topics    Alcohol use: Yes     Comment: RARELY    Drug use: Never     Review of Systems   Constitutional: Positive for fever.   HENT: Negative for sore throat.    Eyes: Negative for pain.   Respiratory: Negative for shortness of breath.    Cardiovascular: Positive for leg swelling (in area of the wound). Negative for chest pain.    Gastrointestinal: Negative for abdominal pain and nausea.   Genitourinary: Negative for dysuria.   Musculoskeletal: Negative for back pain.   Skin: Positive for color change, rash and wound.   Neurological: Negative for weakness.   All other systems reviewed and are negative.      Physical Exam     Initial Vitals [04/07/22 1833]   BP Pulse Resp Temp SpO2   107/72 (!) 125 18 (!) 100.5 °F (38.1 °C) 98 %      MAP       --         Physical Exam    Nursing note and vitals reviewed.  Constitutional: She appears well-developed and well-nourished.   HENT:   Head: Normocephalic and atraumatic.   Eyes: Conjunctivae are normal.   Neck: Neck supple.   Normal range of motion.  Cardiovascular: Normal rate, regular rhythm and normal heart sounds. Exam reveals no gallop and no friction rub.    No murmur heard.  Pulmonary/Chest: Breath sounds normal. No respiratory distress. She has no wheezes. She has no rhonchi. She has no rales.   Abdominal: Abdomen is soft. There is no abdominal tenderness.   Musculoskeletal:         General: No edema. Normal range of motion.      Cervical back: Normal range of motion and neck supple.      Comments: 13 cm diameter from superior to inferior with erythema and tenderness to palpation. Multiple open areas with blocky purulent drainage. See picture below.     Neurological: She is alert and oriented to person, place, and time. GCS score is 15. GCS eye subscore is 4. GCS verbal subscore is 5. GCS motor subscore is 6.   Skin: Skin is warm and dry.   Psychiatric: She has a normal mood and affect.                        ED Course   Critical Care    Date/Time: 4/7/2022 9:34 PM  Performed by: Varghese Cordoba MD  Authorized by: Varghese Cordoba MD   Direct patient critical care time: 10 minutes  Additional history critical care time: 10 minutes  Ordering / reviewing critical care time: 5 minutes  Documentation critical care time: 10 minutes  Consulting other physicians critical care time: 2  minutes  Consult with family critical care time: 5 minutes  Total critical care time (exclusive of procedural time) : 42 minutes  Critical care was necessary to treat or prevent imminent or life-threatening deterioration of the following conditions: sepsis.  Critical care was time spent personally by me on the following activities: evaluation of patient's response to treatment, obtaining history from patient or surrogate, ordering and review of laboratory studies, re-evaluation of patient's condition, ordering and performing treatments and interventions, examination of patient and development of treatment plan with patient or surrogate.        Labs Reviewed   ISTAT PROCEDURE - Abnormal; Notable for the following components:       Result Value    POC PH 7.327 (*)     POC PCO2 46.7 (*)     POC SATURATED O2 77 (*)     POC Lactate 2.51 (*)     All other components within normal limits   ISTAT PROCEDURE - Abnormal; Notable for the following components:    POC PCO2 33.3 (*)     POC PO2 82 (*)     POC HCO3 19.3 (*)     POC TCO2 20 (*)     All other components within normal limits   CULTURE, BLOOD   CULTURE, BLOOD   POCT CBC   SARS-COV-2 RDRP GENE    Narrative:     This test utilizes isothermal nucleic acid amplification   technology to detect the SARS-CoV-2 RdRp nucleic acid segment.   The analytical sensitivity (limit of detection) is 125 genome   equivalents/mL.   A POSITIVE result implies infection with the SARS-CoV-2 virus;   the patient is presumed to be contagious.     A NEGATIVE result means that SARS-CoV-2 nucleic acids are not   present above the limit of detection. A NEGATIVE result should be   treated as presumptive. It does not rule out the possibility of   COVID-19 and should not be the sole basis for treatment decisions.   If COVID-19 is strongly suspected based on clinical and exposure   history, re-testing using an alternate molecular assay should be   considered.   This test is only for use under the Food  and Drug   Administration s Emergency Use Authorization (EUA).   Commercial kits are provided by [employer].   Performance characteristics of the EUA have been independently   verified by [facility] Department of   Pathology and Laboratory Medicine.   _________________________________________________________________   The authorized Fact Sheet for Healthcare Providers and the authorized Fact   Sheet for Patients of the ID NOW COVID-19 are available on the FDA   website:     https://www.fda.gov/media/101732/download  https://www.fda.gov/media/592601/download          POCT CMP   POCT CMP              Imaging Results          X-Ray Tibia Fibula 2 View Right (Final result)  Result time 04/07/22 21:58:53    Final result by Milady Taylor MD (04/07/22 21:58:53)                 Impression:      No acute osseous abnormality identified.      Electronically signed by: Milady Taylor MD  Date:    04/07/2022  Time:    21:58             Narrative:    EXAMINATION:  XR TIBIA FIBULA 2 VIEW RIGHT    CLINICAL HISTORY:  Right lower extremity cellulitis;    TECHNIQUE:  AP and lateral views of the right tibia and fibula were performed.    COMPARISON:  None.    FINDINGS:  No evidence of acute displaced fracture, dislocation, or osseous destructive process.  There is soft tissue swelling.  No radiopaque retained foreign body seen.                                 Medications   acetaminophen tablet 1,000 mg (1,000 mg Oral Given 4/7/22 1910)   piperacillin-tazobactam 4.5 g in dextrose 5 % 100 mL IVPB (ready to mix system) (0 g Intravenous Stopped 4/7/22 2050)   vancomycin in dextrose 5 % 1 gram/250 mL IVPB 1,000 mg (0 mg Intravenous Stopped 4/7/22 2235)   ibuprofen tablet 600 mg (600 mg Oral Given 4/7/22 2001)   0.9%  NaCl infusion (0 mLs Intravenous Stopped 4/7/22 2057)   0.9%  NaCl infusion (0 mLs Intravenous Stopped 4/7/22 2210)   0.9%  NaCl infusion (0 mLs Intravenous Stopped 4/7/22 2308)     Medical Decision  Making:   History:   Old Medical Records: I decided to obtain old medical records.  Initial Assessment:   Lorelei Rodriguez is a 26 y.o. female who presents to the ED for evaluation of right leg redness and drainage onset 1.5 weeks ago.  She states she was seen in clinic and initially, diagnosed with cellulitis and given a prescription for doxycycline.  She was seen for follow-up clinic visit and switched to Augmentin.  She states pain in the right leg is worse and cellulitis does not appear to be improved.  She also states she has experienced increased drainage from her right leg infection, despite taking antibiotics as prescribed.  Pt reports subjective fever.  She has a history of ulcerative colitis, status post total colectomy in 2017 with ileostomy.  Clinical Tests:   Lab Tests: Ordered and Reviewed  ED Management:  Course of ED stay:  1. Cardiovascular-patient has been hemodynamically stable with initial tachycardia.  Heart rate is improved after a normal saline bolus.  2. Pulmonary-patient has had normal sats (greater than 96%) on room air.  3. Hematology/infectious disease-patient, by history has had worsening cellulitis despite 2 different oral antibiotics.  She was febrile initially with a temp of  100.5° in the ED.  fever resolved after Tylenol/ibuprofen.  white blood cell count was 99044 and serum lactate was slightly elevated (2.5).  Blood cultures are pending and patient received Zosyn/vancomycin in the ED.  4. GI/nutrition/renal-CMP was grossly normal.  Patient was accepted to [facility] hospitalist service for further evaluation and treatment (IV antibiotics) for right lower extremity cellulitis, failure of outpatient antibiotics.  Patient was upgraded to ICU secondary to decreased systolic blood pressure.  She received an additional L bolus of normal saline and systolic blood pressures improved from mid to high 80s to low to mid 90s.  During episodes of decreased blood pressure, patient stated she  felt fine.          Scribe Attestation:   Scribe #1: I performed the above scribed service and the documentation accurately describes the services I performed. I attest to the accuracy of the note.               This document was produced by a scribe under my direction and in my presence. I agree with the content of the note and have made any necessary edits.     Dr. Cordoba    04/08/2022 9:34 PM    Clinical Impression:   Final diagnoses:  [L03.115] Cellulitis of right leg (Primary)  [Z16.20] Therapy failure due to antibiotic resistance  [K51.818] Other ulcerative colitis with other complication  [A41.9] Sepsis, due to unspecified organism, unspecified whether acute organ dysfunction present          ED Disposition Condition    Admit               Varghese Cordoba MD  04/08/22 0051

## 2022-04-08 NOTE — PROVIDER TRANSFER
Outside Transfer Acceptance Note / Regional Referral Center    Referring facility: [facility]   Referring provider: NICOLÁS NEGRETE  Accepting facility: [facility]  Accepting provider: MORENO TAMAYO  Reason for transfer: HLOC  Transfer diagnosis: Pyoderma Gangrenosum  Transfer specialty requested: Dermatology  Transfer specialty notified: yes  Transfer level: NUMBER 1-5: 2  Isolation status: No active isolations   Admission class or status:       Narrative     26-year-old woman with PMH ulcerative colitis and pyoderma gangrenosum admitted to [facility] on 04/07 with a 2 week h/o RLE wound.  On presentation T 100.5°, , /53 > 86/49, SpO2 97% (RA).  There was a draining wound over her right tibia which the patient said was similar to the appearance of lesions a/w pyoderma gangrenosum previously on her arm.  CBC and CMP unremarkable.  LA 0.9, COVID neg, x-ray right tibia and fibula pos for soft tissue swelling with no evidence of osteomyelitis.  BC were obtained and the patient was started on IV antibiotics (Zosyn and vancomycin) and was given IVF.  Patient admitted to the ICU due to low BP on admission which resolved with IVF.  VS today (11 15 h) /71, HR 95, RR 20. Transfer requested for inpatient dermatology which is not available at the referring hospital.  Transfer diagnosis pyoderma gangrenosum RLE with secondary infection      Instructions      Bryce Goff-  Admit to Hospital Medicine  Upon patient arrival to floor, please send SecureChat to [facility] or call extension [phone] (if no answer, this will flip to a beeper, so enter your call back number) for Hospital Medicine admit team assignment and for additional admit orders for the patient.  Do not page the attending physician associated with the patient on arrival (this physician may not be on duty at the time of arrival).  Rather, always call [phone] to reach the triage physician for orders and team assignment.

## 2022-04-08 NOTE — PLAN OF CARE
Problem: Adult Inpatient Plan of Care  Goal: Plan of Care Review  Outcome: Ongoing, Progressing  Goal: Patient-Specific Goal (Individualized)  Outcome: Ongoing, Progressing  Goal: Absence of Hospital-Acquired Illness or Injury  Outcome: Ongoing, Progressing  Goal: Optimal Comfort and Wellbeing  Outcome: Ongoing, Progressing  Goal: Readiness for Transition of Care  Outcome: Ongoing, Progressing     Problem: Adjustment to Illness (Sepsis/Septic Shock)  Goal: Optimal Coping  Outcome: Ongoing, Progressing     Problem: Infection Progression (Sepsis/Septic Shock)  Goal: Absence of Infection Signs and Symptoms  Outcome: Ongoing, Progressing     Problem: Impaired Wound Healing  Goal: Optimal Wound Healing  Outcome: Ongoing, Progressing

## 2022-04-08 NOTE — ED NOTES
CALLED FOR ETA ON [facility], WAS TOLD THAT THEY ARE GETTING A CRITICAL UNIT HERE ASAP, MAY BE ANOTHER 30 MIN TO AN HOUR.  THEY ARE AWARE OF PT VITAL SIGNS AND CONDITION.

## 2022-04-08 NOTE — H&P
[facility] Medicine  History & Physical    Patient Name: Lorelei Rodriguez  MRN: [id number]  Patient Class: IP- Inpatient  Admission Date: 4/7/2022  Attending Physician: Fannie Fonseca MD   Primary Care Provider: Batool Hinton MD         Patient information was obtained from patient and ER records.     Subjective:     Principal Problem:Cellulitis of right leg    Chief Complaint:  Pain to the right lower extremity for two weeks.      HPI: Lorelei Rodriguez is a 26 y.o. female with ulcerative colitis who presented initially to [facility] ED with complaints of right leg wound for the past two weeks.  She reports that the wound initially started as a red, raised lesion for which she had a telemedicine visit with her dermatologist on Mar 22, 2022.  She was prescribed a course of doxycycline with which she was compliant.  Unfortunately, it seemed to worsen and she presented to the ED on Apr 2, 2022 and was prescribed a course of amoxicillin/clavulanate.  She also felt that there was no improvement and since that the lesion started to bleed a couple days ago.  She denies any fevers other than today which was 100.5° F. She denies any trauma to her right lower extremity.  She does volunteer that this lesion looks similar to two lesions on her right forearm that was due to pyoderma gangrenosum.  She has otherwise been in her usual state of health.      Chart Review:  Previous Hospitalizations  Date Hospital Diagnosis   Oct 2021 [facility] Vulvovaginal candidiasis with bacterial superinfection      Outpatient Follow-Up  Date of Visit Physician Service   Oct 2021 Yuli Romero MD Gynecology   Jul 2020 Batool Hinton MD Primary Care       Past Medical History:   Diagnosis Date    Pyoderma gangrenosa     UC (ulcerative colitis)        Past Surgical History:   Procedure Laterality Date    ILEOSTOMY      SINUS SURGERY         Review of patient's allergies indicates:   Allergen Reactions    Nickel sutures [surgical  stainless steel]     Nickel Rash       No current facility-administered medications on file prior to encounter.     Current Outpatient Medications on File Prior to Encounter   Medication Sig    amoxicillin-clavulanate 875-125mg (AUGMENTIN) 875-125 mg per tablet Take 1 tablet by mouth 2 (two) times daily. for 10 days    citalopram (CELEXA) 40 MG tablet Take 40 mg by mouth once daily.    mupirocin (BACTROBAN) 2 % ointment Apply topically 2 (two) times daily. for 10 days     Family History    Reviewed and noncontributory        Tobacco Use    Smoking status: Never Smoker    Smokeless tobacco: Never Used   Substance and Sexual Activity    Alcohol use: Yes     Comment: RARELY    Drug use: Never    Sexual activity: Not on file     Review of Systems   Constitutional:  Positive for activity change.   Respiratory:  Negative for shortness of breath.    Cardiovascular:  Negative for chest pain.   Gastrointestinal:  Negative for blood in stool, nausea and vomiting.   Genitourinary:  Negative for dysuria and hematuria.   Skin:  Positive for rash (Right anterior shin).   All other systems reviewed and are negative.  Objective:     Vital Signs (Most Recent):  Temp: 98.4 °F (36.9 °C) (04/08/22 0050)  Pulse: 80 (04/08/22 0100)  Resp: 17 (04/08/22 0100)  BP: (!) 93/59 (04/08/22 0100)  SpO2: 97 % (04/08/22 0100)   Vital Signs (24h Range):  Temp:  [97.7 °F (36.5 °C)-100.5 °F (38.1 °C)] 98.4 °F (36.9 °C)  Pulse:  [] 80  Resp:  [16-48] 17  SpO2:  [97 %-100 %] 97 %  BP: ()/(42-72) 93/59     Weight: 78.4 kg (172 lb 13.5 oz)  Body mass index is 32.66 kg/m².    Physical Exam  Vitals and nursing note reviewed.   Constitutional:       General: She is not in acute distress.     Appearance: Normal appearance. She is normal weight. She is not ill-appearing or toxic-appearing.   HENT:      Head: Normocephalic and atraumatic.      Right Ear: External ear normal.      Left Ear: External ear normal.      Nose: Nose normal.    Cardiovascular:      Rate and Rhythm: Normal rate and regular rhythm.      Pulses: Normal pulses.      Heart sounds: Normal heart sounds. No murmur heard.    No friction rub. No gallop.   Pulmonary:      Effort: Pulmonary effort is normal. No respiratory distress.      Breath sounds: Normal breath sounds. No wheezing, rhonchi or rales.   Abdominal:      General: Abdomen is flat. Bowel sounds are normal. There is no distension.      Palpations: Abdomen is soft.      Tenderness: There is no abdominal tenderness. There is no guarding or rebound.   Musculoskeletal:         General: No swelling. Normal range of motion.   Skin:     Comments: There is a large lesion to the anterior shin with some bloody drainage   Neurological:      General: No focal deficit present.      Mental Status: She is alert and oriented to person, place, and time. Mental status is at baseline.   Psychiatric:         Mood and Affect: Mood normal.         Behavior: Behavior normal.         Thought Content: Thought content normal.         Judgment: Judgment normal.         Significant Labs: All pertinent labs within the past 24 hours have been reviewed.    Significant Imaging: I have reviewed all pertinent imaging results/findings within the past 24 hours.    Assessment/Plan:     * Cellulitis of right leg  · Patient has failed oral antibiotic therapy with doxycycline (Dermatology 3/22) and most recently amoxicillin/clavulanate (ED 4/2)  · She does have evidence of septic shock  · Although this is most likely an infectious process I am also concerned for pyoderma gangrenosum given her history  · Continue intravenous antibiotic therapy  · Consult Rheumatology for further recommendations  · Vasopressor support as tolerated    Sepsis  This patient does have evidence of infective focus  My overall impression is septic shock.  Source:  Right lower extremity cellulitis  Antibiotics given-   Antibiotics (From admission, onward)             Piperacillin/tazobactam and vancomycin        Latest lactate reviewed-  No results for input(s): LACTATE in the last 72 hours.  Organ dysfunction indicated by Lactic acidosis    Shock with decreased perfusion noted, Fluid challenge  was given at 30cc/kg    Post- resuscitation assessment- (Done after fluids given for shock)  Vital signs post fluid administrations were-  Temp Readings from Last 1 Encounters:   04/07/22 98.8 °F (37.1 °C) (Oral)     BP Readings from Last 1 Encounters:   04/07/22 (!) 87/48     Pulse Readings from Last 1 Encounters:   04/07/22 93       Perfusion exam was performed within 6 hours of septic shock presentation after bolus shows Adequate tissue perfusion assessed by non-invasive monitoring  Will Start Pressors- Levophed for MAP of 65  Source control achieved by:           Septic shock  As addressed above    Ulcerative colitis  Stable; there are no acute issues    Pyoderma gangrenosum  As addressed above    VTE Risk Mitigation (From admission, onward)         Ordered     enoxaparin injection 40 mg  Daily         04/08/22 0052     IP VTE HIGH RISK PATIENT  Once         04/08/22 0052     Place sequential compression device  Until discontinued         04/08/22 0052              Critical care time spent on the evaluation and treatment of severe organ dysfunction, review of pertinent labs and imaging studies, discussions with consulting providers and discussions with patient/family: 45 minutes.     Myron Barnard MD  Department of Hospital Medicine   [facility] [place] - Intensive Care

## 2022-04-08 NOTE — CONSULTS
[facility] Note    CC: rash    HPI 26 y.o. female with past medical history of Ulcerative colitis s/p total colectomy with ileostomy (rectum and rectosigmoid present-most recent flex sig in 2/2021 showed chronic active colitis on biopsy) who presents with recurrent, painful, right lower extremity rash concerning for pyoderma gangrenosum.  She denies abdominal pain, nausea, vomiting, melena and hematochezia.  Her ileostomy output has been stable and fluctuates with diet.  She has a small amount of mucous output from her rectum from time to time.    She was initially diagnosed with UC in 2017.  She received one dose of IV Remicade but then her disease was complicated by perforation with abscess and then she underwent near total colectomy with ileostomy.    She has taken PRN Rowasa enemas since her surgery but none currently.    She us following with Dr. Johnson for possible surgical reversal of her ileostomy.    Chart reviewed and summarized here.    Collateral information obtained from her mother who was present at the bedside for the patient interview.    Outside records scanned to media were reviewed and summarized in this note.    Past Medical History  Past Medical History:   Diagnosis Date    Pyoderma gangrenosa     UC (ulcerative colitis)        Past Surgical History  Past Surgical History:   Procedure Laterality Date    ILEOSTOMY      SINUS SURGERY         Social History  Social History     Tobacco Use    Smoking status: Never Smoker    Smokeless tobacco: Never Used   Substance Use Topics    Alcohol use: Yes     Comment: RARELY    Drug use: Never       Family History  No pertinent family history of Crohn's or UC.    Review of Systems  General ROS: negative for chills, fever or weight loss  Psychological ROS: negative for hallucination, depression or suicidal ideation  Ophthalmic ROS: negative for blurry vision, photophobia or eye pain  ENT ROS: negative for epistaxis, sore throat or  "rhinorrhea  Respiratory ROS: no cough, shortness of breath, or wheezing  Cardiovascular ROS: no chest pain or dyspnea on exertion  Gastrointestinal ROS: no abdominal pain, change in bowel habits, or black/ bloody stools  Genito-Urinary ROS: no dysuria, trouble voiding, or hematuria  Musculoskeletal ROS: negative for gait disturbance or muscular weakness  Neurological ROS: no syncope or seizures; no ataxia  Dermatological ROS: negative for pruritis, jaundice, positive for rash    Physical Examination  /69   Pulse 89   Temp 98.4 °F (36.9 °C) (Oral)   Resp (!) 24   Ht 5' 1" (1.549 m)   Wt 78.4 kg (172 lb 13.5 oz)   LMP 03/16/2022   SpO2 97%   Breastfeeding No   BMI 32.66 kg/m²   General appearance: alert, cooperative, no distress  HENT: Normocephalic, atraumatic, neck symmetrical, no nasal discharge   Eyes: conjunctivae/corneas clear, PERRL, EOM's intact  Lungs: clear to auscultation bilaterally, no dullness to percussion bilaterally  Heart: regular rate and rhythm without rub; no displacement of the PMI   Abdomen: soft, non-tender; bowel sounds normoactive; no organomegaly  Extremities: extremities symmetric; no clubbing, cyanosis, or edema  Integument: Skin color, texture, turgor normal; no rashes; hair distrubution normal, bandage to the RLE clean, dry and intact  Neurologic: Alert and oriented X 3, intact sensation to light touch, moves all four extremities  Psychiatric: no pressured speech; normal affect; no evidence of impaired cognition     Labs:  Lab Results   Component Value Date    WBC 11.84 04/08/2022    HGB 12.4 04/08/2022    HCT 38.1 04/08/2022    MCV 85 04/08/2022     04/08/2022         CMP  Sodium   Date Value Ref Range Status   04/08/2022 137 136 - 145 mmol/L Final     Potassium   Date Value Ref Range Status   04/08/2022 3.9 3.5 - 5.1 mmol/L Final     Chloride   Date Value Ref Range Status   04/08/2022 113 (H) 95 - 110 mmol/L Final     CO2   Date Value Ref Range Status   04/08/2022 " 18 (L) 23 - 29 mmol/L Final     Glucose   Date Value Ref Range Status   04/08/2022 85 70 - 110 mg/dL Final     BUN   Date Value Ref Range Status   04/08/2022 6 6 - 20 mg/dL Final     Creatinine   Date Value Ref Range Status   04/08/2022 0.6 0.5 - 1.4 mg/dL Final     Calcium   Date Value Ref Range Status   04/08/2022 7.8 (L) 8.7 - 10.5 mg/dL Final     Total Protein   Date Value Ref Range Status   04/08/2022 5.8 (L) 6.0 - 8.4 g/dL Final     Albumin   Date Value Ref Range Status   04/08/2022 2.5 (L) 3.5 - 5.2 g/dL Final     Total Bilirubin   Date Value Ref Range Status   04/08/2022 1.0 0.1 - 1.0 mg/dL Final     Comment:     For infants and newborns, interpretation of results should be based  on gestational age, weight and in agreement with clinical  observations.    Premature Infant recommended reference ranges:  Up to 24 hours.............<8.0 mg/dL  Up to 48 hours............<12.0 mg/dL  3-5 days..................<15.0 mg/dL  6-29 days.................<15.0 mg/dL       Alkaline Phosphatase   Date Value Ref Range Status   04/08/2022 63 55 - 135 U/L Final     AST   Date Value Ref Range Status   04/08/2022 17 10 - 40 U/L Final     ALT   Date Value Ref Range Status   04/08/2022 17 10 - 44 U/L Final     Anion Gap   Date Value Ref Range Status   04/08/2022 6 (L) 8 - 16 mmol/L Final     eGFR if    Date Value Ref Range Status   04/08/2022 >60 >60 mL/min/1.73 m^2 Final     eGFR if non    Date Value Ref Range Status   04/08/2022 >60 >60 mL/min/1.73 m^2 Final     Comment:     Calculation used to obtain the estimated glomerular filtration  rate (eGFR) is the CKD-EPI equation.          Imaging: CT enterography 2/26/21 (patient was not able to tolerate oral contrast)-evidence of her previous surgery noted, the rectum is fluid filled with enhancement of the rectal wall    I have personally reviewed and interpreted these images.    Assessment:   The patient is a 27 yo female with ulcerative colitis  s/p total colectomy in 2017 due to perforation/abscess formation (rectum remains in place) who presents with a rash to the RLE with associated cellulitis concerning for pyoderma gangrenosum.  GI consulted due to her history of UC and pyoderma.    Plan:   -Labs including Tspot and Hepatitis serologies ordered.  -Discussed performing a repeat CT enterography but the patient reports she cannot tolerate the oral contrast (she did not tolerate it for the study performed in 2021).  -There are plans for transfer to [facility] for dermatology evaluation.  Consider inpatient GI consult at [facility] for possible flex sig next week to assess her disease activity.  -I will request a follow up appointment for the patient with our IBD specialists at [facility].    Hanna Padilla MD

## 2022-04-08 NOTE — ASSESSMENT & PLAN NOTE
· Patient has failed oral antibiotic therapy with doxycycline (Dermatology 3/22) and most recently amoxicillin/clavulanate (ED 4/2)  · She does have evidence of septic shock  · Although this is most likely an infectious process I am also concerned for pyoderma gangrenosum given her history  · Continue intravenous antibiotic therapy  · Consult Rheumatology for further recommendations  · Vasopressor support as tolerated

## 2022-04-09 PROBLEM — L88 PYODERMA GANGRENOSUM: Status: ACTIVE | Noted: 2022-04-07

## 2022-04-09 PROBLEM — E66.09 CLASS 1 OBESITY DUE TO EXCESS CALORIES WITHOUT SERIOUS COMORBIDITY IN ADULT: Status: ACTIVE | Noted: 2022-04-09

## 2022-04-09 LAB
ALBUMIN SERPL BCP-MCNC: 2.5 G/DL (ref 3.5–5.2)
ALP SERPL-CCNC: 83 U/L (ref 55–135)
ALT SERPL W/O P-5'-P-CCNC: 14 U/L (ref 10–44)
ANION GAP SERPL CALC-SCNC: 11 MMOL/L (ref 8–16)
AST SERPL-CCNC: 16 U/L (ref 10–40)
BASOPHILS # BLD AUTO: 0.08 K/UL (ref 0–0.2)
BASOPHILS NFR BLD: 0.7 % (ref 0–1.9)
BILIRUB SERPL-MCNC: 0.9 MG/DL (ref 0.1–1)
BUN SERPL-MCNC: 3 MG/DL (ref 6–20)
CALCIUM SERPL-MCNC: 8.6 MG/DL (ref 8.7–10.5)
CHLORIDE SERPL-SCNC: 103 MMOL/L (ref 95–110)
CO2 SERPL-SCNC: 23 MMOL/L (ref 23–29)
CREAT SERPL-MCNC: 0.7 MG/DL (ref 0.5–1.4)
CRP SERPL-MCNC: 84.6 MG/L (ref 0–8.2)
DIFFERENTIAL METHOD: ABNORMAL
EOSINOPHIL # BLD AUTO: 0.5 K/UL (ref 0–0.5)
EOSINOPHIL NFR BLD: 4.2 % (ref 0–8)
ERYTHROCYTE [DISTWIDTH] IN BLOOD BY AUTOMATED COUNT: 12.5 % (ref 11.5–14.5)
ERYTHROCYTE [SEDIMENTATION RATE] IN BLOOD BY WESTERGREN METHOD: 63 MM/HR (ref 0–36)
EST. GFR  (AFRICAN AMERICAN): >60 ML/MIN/1.73 M^2
EST. GFR  (NON AFRICAN AMERICAN): >60 ML/MIN/1.73 M^2
GLUCOSE SERPL-MCNC: 90 MG/DL (ref 70–110)
HCT VFR BLD AUTO: 37.4 % (ref 37–48.5)
HGB BLD-MCNC: 12.5 G/DL (ref 12–16)
IMM GRANULOCYTES # BLD AUTO: 0.05 K/UL (ref 0–0.04)
IMM GRANULOCYTES NFR BLD AUTO: 0.5 % (ref 0–0.5)
LYMPHOCYTES # BLD AUTO: 2.2 K/UL (ref 1–4.8)
LYMPHOCYTES NFR BLD: 20.1 % (ref 18–48)
MCH RBC QN AUTO: 28.4 PG (ref 27–31)
MCHC RBC AUTO-ENTMCNC: 33.4 G/DL (ref 32–36)
MCV RBC AUTO: 85 FL (ref 82–98)
MONOCYTES # BLD AUTO: 1 K/UL (ref 0.3–1)
MONOCYTES NFR BLD: 9.2 % (ref 4–15)
NEUTROPHILS # BLD AUTO: 7.2 K/UL (ref 1.8–7.7)
NEUTROPHILS NFR BLD: 65.3 % (ref 38–73)
NRBC BLD-RTO: 0 /100 WBC
PLATELET # BLD AUTO: 318 K/UL (ref 150–450)
PMV BLD AUTO: 10.1 FL (ref 9.2–12.9)
POTASSIUM SERPL-SCNC: 2.9 MMOL/L (ref 3.5–5.1)
PROT SERPL-MCNC: 6.2 G/DL (ref 6–8.4)
RBC # BLD AUTO: 4.4 M/UL (ref 4–5.4)
SODIUM SERPL-SCNC: 137 MMOL/L (ref 136–145)
VANCOMYCIN TROUGH SERPL-MCNC: 10.6 UG/ML (ref 10–22)
WBC # BLD AUTO: 11.07 K/UL (ref 3.9–12.7)

## 2022-04-09 PROCEDURE — 87102 FUNGUS ISOLATION CULTURE: CPT | Performed by: STUDENT IN AN ORGANIZED HEALTH CARE EDUCATION/TRAINING PROGRAM

## 2022-04-09 PROCEDURE — 25000003 PHARM REV CODE 250: Performed by: INTERNAL MEDICINE

## 2022-04-09 PROCEDURE — 88312 SPECIAL STAINS GROUP 1: CPT | Mod: 26,,, | Performed by: DERMATOLOGY

## 2022-04-09 PROCEDURE — 88342 IMHCHEM/IMCYTCHM 1ST ANTB: CPT | Performed by: DERMATOLOGY

## 2022-04-09 PROCEDURE — 88312 PR  SPECIAL STAINS,GROUP I: ICD-10-PCS | Mod: 26,,, | Performed by: DERMATOLOGY

## 2022-04-09 PROCEDURE — 11000001 HC ACUTE MED/SURG PRIVATE ROOM

## 2022-04-09 PROCEDURE — 80202 ASSAY OF VANCOMYCIN: CPT | Performed by: STUDENT IN AN ORGANIZED HEALTH CARE EDUCATION/TRAINING PROGRAM

## 2022-04-09 PROCEDURE — 36415 COLL VENOUS BLD VENIPUNCTURE: CPT | Performed by: INTERNAL MEDICINE

## 2022-04-09 PROCEDURE — 87176 TISSUE HOMOGENIZATION CULTR: CPT | Performed by: STUDENT IN AN ORGANIZED HEALTH CARE EDUCATION/TRAINING PROGRAM

## 2022-04-09 PROCEDURE — 88342 IMHCHEM/IMCYTCHM 1ST ANTB: CPT | Mod: 26,,, | Performed by: DERMATOLOGY

## 2022-04-09 PROCEDURE — 63600175 PHARM REV CODE 636 W HCPCS: Performed by: INTERNAL MEDICINE

## 2022-04-09 PROCEDURE — 99233 SBSQ HOSP IP/OBS HIGH 50: CPT | Mod: ,,, | Performed by: STUDENT IN AN ORGANIZED HEALTH CARE EDUCATION/TRAINING PROGRAM

## 2022-04-09 PROCEDURE — 88305 TISSUE EXAM BY PATHOLOGIST: CPT | Performed by: DERMATOLOGY

## 2022-04-09 PROCEDURE — 80053 COMPREHEN METABOLIC PANEL: CPT | Performed by: INTERNAL MEDICINE

## 2022-04-09 PROCEDURE — 85652 RBC SED RATE AUTOMATED: CPT | Performed by: INTERNAL MEDICINE

## 2022-04-09 PROCEDURE — 25000003 PHARM REV CODE 250: Performed by: STUDENT IN AN ORGANIZED HEALTH CARE EDUCATION/TRAINING PROGRAM

## 2022-04-09 PROCEDURE — 85025 COMPLETE CBC W/AUTO DIFF WBC: CPT | Performed by: INTERNAL MEDICINE

## 2022-04-09 PROCEDURE — 99233 PR SUBSEQUENT HOSPITAL CARE,LEVL III: ICD-10-PCS | Mod: ,,, | Performed by: STUDENT IN AN ORGANIZED HEALTH CARE EDUCATION/TRAINING PROGRAM

## 2022-04-09 PROCEDURE — 87206 SMEAR FLUORESCENT/ACID STAI: CPT | Performed by: STUDENT IN AN ORGANIZED HEALTH CARE EDUCATION/TRAINING PROGRAM

## 2022-04-09 PROCEDURE — 36415 COLL VENOUS BLD VENIPUNCTURE: CPT | Performed by: STUDENT IN AN ORGANIZED HEALTH CARE EDUCATION/TRAINING PROGRAM

## 2022-04-09 PROCEDURE — 88342 CHG IMMUNOCYTOCHEMISTRY: ICD-10-PCS | Mod: 26,,, | Performed by: DERMATOLOGY

## 2022-04-09 PROCEDURE — 86140 C-REACTIVE PROTEIN: CPT | Performed by: INTERNAL MEDICINE

## 2022-04-09 PROCEDURE — 87116 MYCOBACTERIA CULTURE: CPT | Performed by: STUDENT IN AN ORGANIZED HEALTH CARE EDUCATION/TRAINING PROGRAM

## 2022-04-09 PROCEDURE — 88312 SPECIAL STAINS GROUP 1: CPT | Mod: 59 | Performed by: DERMATOLOGY

## 2022-04-09 PROCEDURE — 87075 CULTR BACTERIA EXCEPT BLOOD: CPT | Performed by: STUDENT IN AN ORGANIZED HEALTH CARE EDUCATION/TRAINING PROGRAM

## 2022-04-09 PROCEDURE — 87070 CULTURE OTHR SPECIMN AEROBIC: CPT | Performed by: STUDENT IN AN ORGANIZED HEALTH CARE EDUCATION/TRAINING PROGRAM

## 2022-04-09 PROCEDURE — 63600175 PHARM REV CODE 636 W HCPCS: Performed by: STUDENT IN AN ORGANIZED HEALTH CARE EDUCATION/TRAINING PROGRAM

## 2022-04-09 PROCEDURE — 94761 N-INVAS EAR/PLS OXIMETRY MLT: CPT

## 2022-04-09 PROCEDURE — 88305 TISSUE EXAM BY PATHOLOGIST: ICD-10-PCS | Mod: 26,,, | Performed by: DERMATOLOGY

## 2022-04-09 PROCEDURE — 88305 TISSUE EXAM BY PATHOLOGIST: CPT | Mod: 26,,, | Performed by: DERMATOLOGY

## 2022-04-09 RX ORDER — SODIUM CHLORIDE 9 MG/ML
INJECTION, SOLUTION INTRAVENOUS CONTINUOUS
Status: DISCONTINUED | OUTPATIENT
Start: 2022-04-09 | End: 2022-04-09

## 2022-04-09 RX ORDER — LORAZEPAM 2 MG/ML
2 INJECTION INTRAMUSCULAR ONCE
Status: COMPLETED | OUTPATIENT
Start: 2022-04-09 | End: 2022-04-09

## 2022-04-09 RX ORDER — POTASSIUM CHLORIDE 20 MEQ/1
40 TABLET, EXTENDED RELEASE ORAL 2 TIMES DAILY
Status: COMPLETED | OUTPATIENT
Start: 2022-04-09 | End: 2022-04-09

## 2022-04-09 RX ORDER — MORPHINE SULFATE 4 MG/ML
4 INJECTION, SOLUTION INTRAMUSCULAR; INTRAVENOUS ONCE
Status: DISCONTINUED | OUTPATIENT
Start: 2022-04-09 | End: 2022-04-10

## 2022-04-09 RX ORDER — SODIUM CHLORIDE 9 MG/ML
INJECTION, SOLUTION INTRAVENOUS CONTINUOUS
Status: DISCONTINUED | OUTPATIENT
Start: 2022-04-09 | End: 2022-04-11

## 2022-04-09 RX ADMIN — VANCOMYCIN HYDROCHLORIDE 1500 MG: 1.5 INJECTION, POWDER, LYOPHILIZED, FOR SOLUTION INTRAVENOUS at 05:04

## 2022-04-09 RX ADMIN — SODIUM CHLORIDE: 0.9 INJECTION, SOLUTION INTRAVENOUS at 07:04

## 2022-04-09 RX ADMIN — VANCOMYCIN HYDROCHLORIDE 1250 MG: 1.25 INJECTION, POWDER, LYOPHILIZED, FOR SOLUTION INTRAVENOUS at 01:04

## 2022-04-09 RX ADMIN — SODIUM CHLORIDE 1000 ML: 0.9 INJECTION, SOLUTION INTRAVENOUS at 06:04

## 2022-04-09 RX ADMIN — MORPHINE SULFATE 4 MG: 4 INJECTION INTRAVENOUS at 09:04

## 2022-04-09 RX ADMIN — MORPHINE SULFATE 4 MG: 4 INJECTION INTRAVENOUS at 03:04

## 2022-04-09 RX ADMIN — HYDROCODONE BITARTRATE AND ACETAMINOPHEN 1 TABLET: 5; 325 TABLET ORAL at 08:04

## 2022-04-09 RX ADMIN — CITALOPRAM HYDROBROMIDE 40 MG: 20 TABLET ORAL at 08:04

## 2022-04-09 RX ADMIN — PROCHLORPERAZINE EDISYLATE 5 MG: 5 INJECTION INTRAMUSCULAR; INTRAVENOUS at 08:04

## 2022-04-09 RX ADMIN — PIPERACILLIN SODIUM AND TAZOBACTAM SODIUM 4.5 G: 4; .5 INJECTION, POWDER, FOR SOLUTION INTRAVENOUS at 03:04

## 2022-04-09 RX ADMIN — HYDROCODONE BITARTRATE AND ACETAMINOPHEN 1 TABLET: 5; 325 TABLET ORAL at 05:04

## 2022-04-09 RX ADMIN — LORAZEPAM 2 MG: 2 INJECTION INTRAMUSCULAR; INTRAVENOUS at 06:04

## 2022-04-09 RX ADMIN — POTASSIUM CHLORIDE 40 MEQ: 20 TABLET, EXTENDED RELEASE ORAL at 08:04

## 2022-04-09 RX ADMIN — MUPIROCIN: 20 OINTMENT TOPICAL at 08:04

## 2022-04-09 RX ADMIN — POTASSIUM CHLORIDE 40 MEQ: 20 TABLET, EXTENDED RELEASE ORAL at 01:04

## 2022-04-09 RX ADMIN — PIPERACILLIN SODIUM AND TAZOBACTAM SODIUM 4.5 G: 4; .5 INJECTION, POWDER, FOR SOLUTION INTRAVENOUS at 02:04

## 2022-04-09 RX ADMIN — PIPERACILLIN SODIUM AND TAZOBACTAM SODIUM 4.5 G: 4; .5 INJECTION, POWDER, FOR SOLUTION INTRAVENOUS at 08:04

## 2022-04-09 RX ADMIN — MUPIROCIN: 20 OINTMENT TOPICAL at 09:04

## 2022-04-09 NOTE — HOSPITAL COURSE
Pt transferred to [facility] and arrived on 4/9. Other than RLE wound, she remains HDS and grossly asymptomatic. Dermatology consulted for assistance for likely pyoderma gangrenosum; continuing coverage with abx for possible superimposed cellulitis.

## 2022-04-09 NOTE — ASSESSMENT & PLAN NOTE
- Interval history and physical exam findings as described above  - Does appear to have history of less severe ulcerative lesions  - Not on any immune-modifying therapy  - Treating for possible overlying cellulitis as below  - Dermatology consulted, appreciate assistance  - Wound care consulted  - PRN analgesics provided  - Continuing to monitor

## 2022-04-09 NOTE — PROGRESS NOTES
Pharmacokinetic Assessment Follow Up: IV Vancomycin    Vancomycin serum concentration assessment(s):    · Vancomycin level was drawn appropriately and can be used to guide therapy  · Vancomycin level is therapeutic at 10.6 mcg/mL; goal 10-20 mcg/mL    Vancomycin Regimen Plan:    · Increase Vancomycin to 1500 mg every 12 hours to maintain levels within goal (slightly above goal of 10-20 mcg/mL)  · Obtain Vancomycin trough on 4/11 at 0400   · Re-dose Vancomycin based on renal function and level    Drug levels (last 3 results):  Recent Labs   Lab Result Units 04/09/22  1409   Vancomycin-Trough ug/mL 10.6       Pharmacy will continue to follow and monitor vancomycin.    Please contact pharmacy at extension [phone] for questions regarding this assessment.    Thank you for the consult,   Gerardo Degroot       Patient brief summary:  Lorelei Rodriguez is a 26 y.o. female initiated on antimicrobial therapy with IV Vancomycin for treatment of skin & soft tissue infection    Drug Allergies:   Review of patient's allergies indicates:   Allergen Reactions    Nickel sutures [surgical stainless steel]     Nickel Rash       Actual Body Weight:   78.4 kg    Renal Function:   Estimated Creatinine Clearance: 115.4 mL/min (based on SCr of 0.7 mg/dL).,     Dialysis Method (if applicable):  N/A    CBC (last 72 hours):  Recent Labs   Lab Result Units 04/08/22  0404 04/09/22  0338   WBC K/uL 11.84 11.07   Hemoglobin g/dL 12.4 12.5   Hematocrit % 38.1 37.4   Platelets K/uL 247 318   Gran % % 68.8 65.3   Lymph % % 16.4* 20.1   Mono % % 10.1 9.2   Eosinophil % % 3.9 4.2   Basophil % % 0.5 0.7   Differential Method  Automated Automated       Metabolic Panel (last 72 hours):  Recent Labs   Lab Result Units 04/08/22  0404 04/09/22  0338   Sodium mmol/L 137 137   Potassium mmol/L 3.9 2.9*   Chloride mmol/L 113* 103   CO2 mmol/L 18* 23   Glucose mg/dL 85 90   BUN mg/dL 6 3*   Creatinine mg/dL 0.6 0.7   Albumin g/dL 2.5* 2.5*   Total Bilirubin mg/dL  1.0 0.9   Alkaline Phosphatase U/L 63 83   AST U/L 17 16   ALT U/L 17 14       Vancomycin Administrations:  vancomycin given in the last 96 hours                   vancomycin 1.25 g in dextrose 5% 250 mL IVPB (ready to mix) (mg) 1,250 mg New Bag 04/09/22 0124     1,250 mg New Bag 04/08/22 0915    vancomycin in dextrose 5 % 1 gram/250 mL IVPB 1,000 mg (mg) 1,000 mg New Bag 04/07/22 2055                Microbiologic Results:  Microbiology Results (last 7 days)     Procedure Component Value Units Date/Time    Blood culture [[id number]] Collected: 04/07/22 1954    Order Status: Completed Specimen: Blood from Peripheral, Antecubital, Right Updated: 04/09/22 0303     Blood Culture, Routine No Growth to date      No Growth to date    Blood culture [[id number]] Collected: 04/07/22 2014    Order Status: Completed Specimen: Blood from Peripheral, Antecubital, Left Updated: 04/09/22 0303     Blood Culture, Routine No Growth to date      No Growth to date

## 2022-04-09 NOTE — SIGNIFICANT EVENT
Assessed patient after arrival to unit.  No acute needs identified.  Plan of care reviewed with patient.  Orders adjusted as appropriate.

## 2022-04-09 NOTE — ASSESSMENT & PLAN NOTE
- Interval history and physical exam findings as described above  - Likely pyoderma gangrenosum, though given previous fever and soft BP, concern for concurrent cellulitis  - BCx pending  - Continue empiric vanc and zosyn

## 2022-04-09 NOTE — SUBJECTIVE & OBJECTIVE
Interval History: Pt seen and examined this morning on rounds. MARIBELL. States she is feeling well other than RLE pain. Discussed need for dermatology evaluation today, mother updated on speaker phone. Care plan reviewed. Otherwise, doing well and with no further complaints at this time.        Objective:     Vital Signs (Most Recent):  Temp: 98.4 °F (36.9 °C) (04/09/22 1201)  Pulse: 105 (04/09/22 1201)  Resp: 18 (04/09/22 1201)  BP: 101/63 (04/09/22 1201)  SpO2: (!) 93 % (04/09/22 1201)   Vital Signs (24h Range):  Temp:  [97.6 °F (36.4 °C)-99.5 °F (37.5 °C)] 98.4 °F (36.9 °C)  Pulse:  [] 105  Resp:  [18-20] 18  SpO2:  [93 %-99 %] 93 %  BP: ()/(52-74) 101/63     Weight: 78.4 kg (172 lb 13.5 oz)  Body mass index is 32.66 kg/m².    Intake/Output Summary (Last 24 hours) at 4/9/2022 1206  Last data filed at 4/8/2022 1721  Gross per 24 hour   Intake 360 ml   Output --   Net 360 ml        Physical Exam  Gen: in NAD, appears stated age; pt is obese  Neuro: AAOx4, CN2-12 grossly intact BL; motor, sensory, and strength grossly intact BL  HEENT: NTNC, EOMI, PERRLA, MMM; no thyromegaly or lymphadenopathy; no JVD appreciated  CVS: RRR, no m/r/g; S1/S2 auscultated with no S3 or S4; capillary refill < 2 sec  Resp: lungs CTAB, no w/r/r; no belabored breathing or accessory muscle use appreciated   Abd: BS+ in all 4 quadrants; NTND, soft to palpation; no organomegaly appreciated   Extrem: pulses full, equal, and regular over all 4 extremities; no UE or LE edema BL; RLE wound as below          Significant Labs: All pertinent labs within the past 24 hours have been reviewed.    Significant Imaging: I have reviewed all pertinent imaging results/findings within the past 24 hours.

## 2022-04-09 NOTE — PROGRESS NOTES
[facility] Medicine  Progress Note    Patient Name: Lorelei Rodriguez  MRN: [id number]  Patient Class: IP- Inpatient   Admission Date: 4/7/2022  Length of Stay: 2 days  Attending Physician: Melvin Sandoval MD  Primary Care Provider: Batool Hinton MD        Subjective:     Principal Problem:Pyoderma gangrenosum        HPI:  Lorelei Rodriguez is a 26 y.o. female with ulcerative colitis who presented initially to [facility] ED with complaints of right leg wound for the past two weeks.  She reports that the wound initially started as a red, raised lesion for which she had a telemedicine visit with her dermatologist on Mar 22, 2022.  She was prescribed a course of doxycycline with which she was compliant.  Unfortunately, it seemed to worsen and she presented to the ED on Apr 2, 2022 and was prescribed a course of amoxicillin/clavulanate.  She also felt that there was no improvement and since that the lesion started to bleed a couple days ago.  She denies any fevers other than today which was 100.5° F. She denies any trauma to her right lower extremity.  She does volunteer that this lesion looks similar to two lesions on her right forearm that was due to pyoderma gangrenosum.  She has otherwise been in her usual state of health.        Overview/Hospital Course:  Pt transferred to [facility] and arrived on 4/9. Other than RLE wound, she remains HDS and grossly asymptomatic. Dermatology consulted for assistance for likely pyoderma gangrenosum; continuing coverage with abx for possible superimposed cellulitis.      Interval History: Pt seen and examined this morning on maryjane. MARIBELL. States she is feeling well other than RLE pain. Discussed need for dermatology evaluation today, mother updated on speaker phone. Care plan reviewed. Otherwise, doing well and with no further complaints at this time.        Objective:     Vital Signs (Most Recent):  Temp: 98.4 °F (36.9 °C) (04/09/22 1201)  Pulse: 105 (04/09/22 1201)  Resp: 18  (04/09/22 1201)  BP: 101/63 (04/09/22 1201)  SpO2: (!) 93 % (04/09/22 1201)   Vital Signs (24h Range):  Temp:  [97.6 °F (36.4 °C)-99.5 °F (37.5 °C)] 98.4 °F (36.9 °C)  Pulse:  [] 105  Resp:  [18-20] 18  SpO2:  [93 %-99 %] 93 %  BP: ()/(52-74) 101/63     Weight: 78.4 kg (172 lb 13.5 oz)  Body mass index is 32.66 kg/m².    Intake/Output Summary (Last 24 hours) at 4/9/2022 1206  Last data filed at 4/8/2022 1721  Gross per 24 hour   Intake 360 ml   Output --   Net 360 ml        Physical Exam  Gen: in NAD, appears stated age; pt is obese  Neuro: AAOx4, CN2-12 grossly intact BL; motor, sensory, and strength grossly intact BL  HEENT: NTNC, EOMI, PERRLA, MMM; no thyromegaly or lymphadenopathy; no JVD appreciated  CVS: RRR, no m/r/g; S1/S2 auscultated with no S3 or S4; capillary refill < 2 sec  Resp: lungs CTAB, no w/r/r; no belabored breathing or accessory muscle use appreciated   Abd: BS+ in all 4 quadrants; NTND, soft to palpation; no organomegaly appreciated   Extrem: pulses full, equal, and regular over all 4 extremities; no UE or LE edema BL; RLE wound as below          Significant Labs: All pertinent labs within the past 24 hours have been reviewed.    Significant Imaging: I have reviewed all pertinent imaging results/findings within the past 24 hours.      Assessment/Plan:      * Pyoderma gangrenosum  - Interval history and physical exam findings as described above  - Does appear to have history of less severe ulcerative lesions  - Not on any immune-modifying therapy  - Treating for possible overlying cellulitis as below  - Dermatology consulted, appreciate assistance  - Wound care consulted  - PRN analgesics provided  - Continuing to monitor    Cellulitis of right leg  - Interval history and physical exam findings as described above  - Likely pyoderma gangrenosum, though given previous fever and soft BP, concern for concurrent cellulitis  - BCx pending  - Continue empiric vanc and zosyn    Ulcerative  colitis  - S/p total colectomy in 2017  - No current medication regimen    Class 1 obesity due to excess calories without serious comorbidity in adult  - Body mass index is 32.66 kg/m².  - Pt counseled on dietary and lifestyle modifications in relation to health  - Consider dietary/nutrition consult outpatient    VTE Risk Mitigation (From admission, onward)         Ordered     IP VTE HIGH RISK PATIENT  Once         04/08/22 0052     Place sequential compression device  Until discontinued         04/08/22 0052                Discharge Planning   AFSHIN: 4/12/2022     Code Status: Full Code   Is the patient medically ready for discharge?: No    Reason for patient still in hospital (select all that apply): Patient trending condition             Melvin Sandoval MD  Attending Physician  Department of Hospital Medicine  Epic secure chat preferred, or [employer]Link ext. [phone]  4/9/2022

## 2022-04-09 NOTE — ASSESSMENT & PLAN NOTE
- Body mass index is 32.66 kg/m².  - Pt counseled on dietary and lifestyle modifications in relation to health  - Consider dietary/nutrition consult outpatient

## 2022-04-09 NOTE — HPI
25 y/o F w/ hx of UC (s/p total colectomy with ileostomy, not on current therapy) and pyoderma gangrenosum in 2019. Per patient she was diagnosed with UC in 2018 and a few weeks later had a total colectomy. Patient then had PG in 2019 which was on legs, chest, and face and resolved within a couple months. She was on high dose steroids at that time and was not on any other systemic therapies.   The current skin lesion on RLE started about 2 weeks ago and she completed a course of Doxyxycline which did not improve the lesion. She started noticed it rapidly worsened in the past 4-5 days, and on 4/7 it started to ooze and bleed. She went to the ER on 4/7 at an OSH and was hypotensive which improved with fluid boluses. Patient had a fever of 100.5 on 4/7. She was transferred to [facility] for further evaluation.   Denies fevers, chills, or any other skin lesions.

## 2022-04-10 PROBLEM — Z93.2 ILEOSTOMY STATUS: Status: ACTIVE | Noted: 2022-04-10

## 2022-04-10 LAB
ALBUMIN SERPL BCP-MCNC: 2.7 G/DL (ref 3.5–5.2)
ALP SERPL-CCNC: 87 U/L (ref 55–135)
ALT SERPL W/O P-5'-P-CCNC: 14 U/L (ref 10–44)
ANION GAP SERPL CALC-SCNC: 11 MMOL/L (ref 8–16)
AST SERPL-CCNC: 18 U/L (ref 10–40)
BASOPHILS # BLD AUTO: 0.05 K/UL (ref 0–0.2)
BASOPHILS NFR BLD: 0.5 % (ref 0–1.9)
BILIRUB SERPL-MCNC: 0.6 MG/DL (ref 0.1–1)
BUN SERPL-MCNC: 3 MG/DL (ref 6–20)
CALCIUM SERPL-MCNC: 8.8 MG/DL (ref 8.7–10.5)
CHLORIDE SERPL-SCNC: 107 MMOL/L (ref 95–110)
CO2 SERPL-SCNC: 25 MMOL/L (ref 23–29)
CREAT SERPL-MCNC: 0.9 MG/DL (ref 0.5–1.4)
DIFFERENTIAL METHOD: ABNORMAL
EOSINOPHIL # BLD AUTO: 0.3 K/UL (ref 0–0.5)
EOSINOPHIL NFR BLD: 3.3 % (ref 0–8)
ERYTHROCYTE [DISTWIDTH] IN BLOOD BY AUTOMATED COUNT: 12.6 % (ref 11.5–14.5)
EST. GFR  (AFRICAN AMERICAN): >60 ML/MIN/1.73 M^2
EST. GFR  (NON AFRICAN AMERICAN): >60 ML/MIN/1.73 M^2
GLUCOSE SERPL-MCNC: 89 MG/DL (ref 70–110)
HCT VFR BLD AUTO: 39.6 % (ref 37–48.5)
HGB BLD-MCNC: 12.7 G/DL (ref 12–16)
IMM GRANULOCYTES # BLD AUTO: 0.03 K/UL (ref 0–0.04)
IMM GRANULOCYTES NFR BLD AUTO: 0.3 % (ref 0–0.5)
LYMPHOCYTES # BLD AUTO: 0.9 K/UL (ref 1–4.8)
LYMPHOCYTES NFR BLD: 9.6 % (ref 18–48)
MAGNESIUM SERPL-MCNC: 1.7 MG/DL (ref 1.6–2.6)
MCH RBC QN AUTO: 27.5 PG (ref 27–31)
MCHC RBC AUTO-ENTMCNC: 32.1 G/DL (ref 32–36)
MCV RBC AUTO: 86 FL (ref 82–98)
MONOCYTES # BLD AUTO: 0.9 K/UL (ref 0.3–1)
MONOCYTES NFR BLD: 8.8 % (ref 4–15)
NEUTROPHILS # BLD AUTO: 7.5 K/UL (ref 1.8–7.7)
NEUTROPHILS NFR BLD: 77.5 % (ref 38–73)
NRBC BLD-RTO: 0 /100 WBC
PHOSPHATE SERPL-MCNC: 2.6 MG/DL (ref 2.7–4.5)
PLATELET # BLD AUTO: 316 K/UL (ref 150–450)
PMV BLD AUTO: 9.9 FL (ref 9.2–12.9)
POTASSIUM SERPL-SCNC: 3.9 MMOL/L (ref 3.5–5.1)
PROT SERPL-MCNC: 6.1 G/DL (ref 6–8.4)
RBC # BLD AUTO: 4.62 M/UL (ref 4–5.4)
SODIUM SERPL-SCNC: 143 MMOL/L (ref 136–145)
WBC # BLD AUTO: 9.67 K/UL (ref 3.9–12.7)

## 2022-04-10 PROCEDURE — 36415 COLL VENOUS BLD VENIPUNCTURE: CPT | Performed by: STUDENT IN AN ORGANIZED HEALTH CARE EDUCATION/TRAINING PROGRAM

## 2022-04-10 PROCEDURE — 25000003 PHARM REV CODE 250: Performed by: INTERNAL MEDICINE

## 2022-04-10 PROCEDURE — 80053 COMPREHEN METABOLIC PANEL: CPT | Performed by: STUDENT IN AN ORGANIZED HEALTH CARE EDUCATION/TRAINING PROGRAM

## 2022-04-10 PROCEDURE — 99254 PR INITIAL INPATIENT CONSULT,LEVL IV: ICD-10-PCS | Mod: ,,, | Performed by: STUDENT IN AN ORGANIZED HEALTH CARE EDUCATION/TRAINING PROGRAM

## 2022-04-10 PROCEDURE — 85025 COMPLETE CBC W/AUTO DIFF WBC: CPT | Performed by: STUDENT IN AN ORGANIZED HEALTH CARE EDUCATION/TRAINING PROGRAM

## 2022-04-10 PROCEDURE — 63600175 PHARM REV CODE 636 W HCPCS: Performed by: STUDENT IN AN ORGANIZED HEALTH CARE EDUCATION/TRAINING PROGRAM

## 2022-04-10 PROCEDURE — 83735 ASSAY OF MAGNESIUM: CPT | Performed by: STUDENT IN AN ORGANIZED HEALTH CARE EDUCATION/TRAINING PROGRAM

## 2022-04-10 PROCEDURE — 11000001 HC ACUTE MED/SURG PRIVATE ROOM

## 2022-04-10 PROCEDURE — 99233 SBSQ HOSP IP/OBS HIGH 50: CPT | Mod: 95,,, | Performed by: INTERNAL MEDICINE

## 2022-04-10 PROCEDURE — 99254 IP/OBS CNSLTJ NEW/EST MOD 60: CPT | Mod: ,,, | Performed by: STUDENT IN AN ORGANIZED HEALTH CARE EDUCATION/TRAINING PROGRAM

## 2022-04-10 PROCEDURE — 25000003 PHARM REV CODE 250: Performed by: STUDENT IN AN ORGANIZED HEALTH CARE EDUCATION/TRAINING PROGRAM

## 2022-04-10 PROCEDURE — 63600175 PHARM REV CODE 636 W HCPCS: Performed by: INTERNAL MEDICINE

## 2022-04-10 PROCEDURE — 84100 ASSAY OF PHOSPHORUS: CPT | Performed by: STUDENT IN AN ORGANIZED HEALTH CARE EDUCATION/TRAINING PROGRAM

## 2022-04-10 PROCEDURE — 99233 PR SUBSEQUENT HOSPITAL CARE,LEVL III: ICD-10-PCS | Mod: 95,,, | Performed by: INTERNAL MEDICINE

## 2022-04-10 RX ORDER — SODIUM,POTASSIUM PHOSPHATES 280-250MG
2 POWDER IN PACKET (EA) ORAL ONCE
Status: COMPLETED | OUTPATIENT
Start: 2022-04-10 | End: 2022-04-10

## 2022-04-10 RX ADMIN — HYDROCODONE BITARTRATE AND ACETAMINOPHEN 1 TABLET: 5; 325 TABLET ORAL at 11:04

## 2022-04-10 RX ADMIN — HYDROCODONE BITARTRATE AND ACETAMINOPHEN 1 TABLET: 5; 325 TABLET ORAL at 04:04

## 2022-04-10 RX ADMIN — CITALOPRAM HYDROBROMIDE 40 MG: 20 TABLET ORAL at 08:04

## 2022-04-10 RX ADMIN — PIPERACILLIN SODIUM AND TAZOBACTAM SODIUM 4.5 G: 4; .5 INJECTION, POWDER, FOR SOLUTION INTRAVENOUS at 11:04

## 2022-04-10 RX ADMIN — MORPHINE SULFATE 4 MG: 4 INJECTION INTRAVENOUS at 11:04

## 2022-04-10 RX ADMIN — PROCHLORPERAZINE EDISYLATE 5 MG: 5 INJECTION INTRAMUSCULAR; INTRAVENOUS at 11:04

## 2022-04-10 RX ADMIN — PIPERACILLIN SODIUM AND TAZOBACTAM SODIUM 4.5 G: 4; .5 INJECTION, POWDER, FOR SOLUTION INTRAVENOUS at 08:04

## 2022-04-10 RX ADMIN — VANCOMYCIN HYDROCHLORIDE 1500 MG: 1.5 INJECTION, POWDER, LYOPHILIZED, FOR SOLUTION INTRAVENOUS at 04:04

## 2022-04-10 RX ADMIN — PIPERACILLIN SODIUM AND TAZOBACTAM SODIUM 4.5 G: 4; .5 INJECTION, POWDER, FOR SOLUTION INTRAVENOUS at 05:04

## 2022-04-10 RX ADMIN — MUPIROCIN: 20 OINTMENT TOPICAL at 08:04

## 2022-04-10 RX ADMIN — POTASSIUM & SODIUM PHOSPHATES POWDER PACK 280-160-250 MG 2 PACKET: 280-160-250 PACK at 11:04

## 2022-04-10 RX ADMIN — SODIUM CHLORIDE: 0.9 INJECTION, SOLUTION INTRAVENOUS at 05:04

## 2022-04-10 RX ADMIN — HYDROCODONE BITARTRATE AND ACETAMINOPHEN 1 TABLET: 5; 325 TABLET ORAL at 05:04

## 2022-04-10 NOTE — CONSULTS
Thank you for your consult to [facility]. We have reviewed the patient chart. This patient does meet criteria for [facility] service at this time. Will assume care on 04/10/22 at 7AM.    Tracey Wright MD

## 2022-04-10 NOTE — CONSULTS
"[facility] Surg  Infectious Disease  Consult Note    Patient Name: Lorelei Rodriguez  MRN: [id number]  Admission Date: 4/7/2022  Hospital Length of Stay: 3 days  Attending Physician: Tracey Wright MD  Primary Care Provider: Batool Hinton MD     Isolation Status: No active isolations    Patient information was obtained from patient, parent, past medical records and ER records.      Inpatient consult to Infectious Diseases  Consult performed by: Fannie Voss MD  Consult ordered by: Melvin Sandoval MD        Assessment/Plan:     * Pyoderma gangrenosum  See cellulitis    Cellulitis of right leg  Improving erythema/swelling since initiation of abx per pt - clinically wounds appear to be c/w PG. S/p skin biopsy, path/cx pending.     Recommendations:  -stop zosyn to decrease risk of ROSALINO with concomitant vanco  -start ceftriaxone 2g iv daily and continue vanco pharm to dose  -anticipate 7d course total for SSTI given reported rapid improvement since admission, diego 4/13  -if lesion appears to worsen, consider CT of RLE to evaluate for deeper infection  -PG mgmt per derm        Thank you for your consult. I will sign off. Please contact us if you have any additional questions. Above d/w primary team.     Fannie Voss MD  Infectious Disease  [facility] Surg    Subjective:     Principal Problem: Pyoderma gangrenosum    HPI: 27 yo female with UC s/p total colectomy with ileostomy and PG admitted for derm evaluation/skin biopsy. 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 Pt was initially admitted to [facility] with reported 2w hx of RLE wound with drainage. She was initially febrile on admission with Tmax 100.5, though now hemodynamically stable, afebrile and without leukocytosis. She was started on vanc/zosyn. Xray of RLE noted to have soft tissue swelling, no osseous destruction. S/p biopsy 4/9 with derm - path/cx in process. Of note, pt reported completing a prior course " "of doxycycline without improvement in wound. Denies allergies to abx. Reported last episode of PG (on UE/LE) was in 2019. Denies toxic habits or TB exposure. Family hx of eczema.       Past Medical History:   Diagnosis Date    Pyoderma gangrenosa     UC (ulcerative colitis)        Past Surgical History:   Procedure Laterality Date    ILEOSTOMY      SINUS SURGERY         Review of patient's allergies indicates:   Allergen Reactions    Nickel sutures [surgical stainless steel]     Nickel Rash       Medications:  Medications Prior to Admission   Medication Sig    amoxicillin-clavulanate 875-125mg (AUGMENTIN) 875-125 mg per tablet Take 1 tablet by mouth 2 (two) times daily. for 10 days    citalopram (CELEXA) 40 MG tablet Take 40 mg by mouth once daily.    mupirocin (BACTROBAN) 2 % ointment Apply topically 2 (two) times daily. for 10 days     Antibiotics (From admission, onward)                Start     Stop Route Frequency Ordered    04/09/22 1700  vancomycin 1.5 g in dextrose 5 % 250 mL IVPB (ready to mix)         -- IV Every 12 hours (non-standard times) 04/09/22 1545    04/08/22 2015  piperacillin-tazobactam 4.5 g in sodium chloride 0.9% 100 mL IVPB (ready to mix system)         -- IV Every 8 hours (non-standard times) 04/08/22 1934    04/08/22 1100  mupirocin 2 % ointment         04/13 0859 Nasl 2 times daily 04/08/22 0954    04/08/22 0052  vancomycin - pharmacy to dose  (vancomycin IVPB)        "And" Linked Group Details    -- IV pharmacy to manage frequency 04/08/22 0052          Antifungals (From admission, onward)                None          Antivirals (From admission, onward)      None               There is no immunization history on file for this patient.    Family History    None       Social History     Socioeconomic History    Marital status: Single   Tobacco Use    Smoking status: Never Smoker    Smokeless tobacco: Never Used   Substance and Sexual Activity    Alcohol use: Yes     Comment: " RARELY    Drug use: Never     Review of Systems   Constitutional:  Negative for chills and fever.   Skin:  Positive for rash and wound.   All other systems reviewed and are negative.  Objective:     Vital Signs (Most Recent):  Temp: 98.4 °F (36.9 °C) (04/10/22 0413)  Pulse: (!) 113 (04/10/22 0413)  Resp: 18 (Simultaneous filing. User may not have seen previous data.) (04/10/22 0413)  BP: 115/74 (04/10/22 0413)  SpO2: 96 % (04/10/22 0413)   Vital Signs (24h Range):  Temp:  [98.1 °F (36.7 °C)-98.4 °F (36.9 °C)] 98.4 °F (36.9 °C)  Pulse:  [] 113  Resp:  [18-20] 18  SpO2:  [93 %-97 %] 96 %  BP: (101-115)/(63-74) 115/74     Weight: 78.4 kg (172 lb 13.5 oz)  Body mass index is 32.66 kg/m².    Estimated Creatinine Clearance: 89.7 mL/min (based on SCr of 0.9 mg/dL).    Physical Exam  Constitutional:       General: She is not in acute distress.     Appearance: She is not ill-appearing or toxic-appearing.   HENT:      Head: Normocephalic and atraumatic.      Right Ear: External ear normal.      Left Ear: External ear normal.      Mouth/Throat:      Mouth: Mucous membranes are moist.      Pharynx: Oropharynx is clear. No oropharyngeal exudate or posterior oropharyngeal erythema.   Eyes:      General: No scleral icterus.        Right eye: No discharge.         Left eye: No discharge.   Cardiovascular:      Rate and Rhythm: Normal rate and regular rhythm.   Pulmonary:      Effort: Pulmonary effort is normal. No respiratory distress.      Breath sounds: No stridor. No wheezing or rhonchi.   Abdominal:      General: Bowel sounds are normal. There is no distension.      Palpations: Abdomen is soft.      Tenderness: There is no abdominal tenderness. There is no guarding.      Comments: Ileostomy with formed stool   Musculoskeletal:         General: Tenderness (around RLE wound) present.      Right lower leg: No edema.      Left lower leg: No edema.   Skin:     General: Skin is warm and dry.      Findings: Erythema (improving)  and lesion present.   Neurological:      Mental Status: She is alert and oriented to person, place, and time. Mental status is at baseline.      Motor: No weakness.   Psychiatric:         Mood and Affect: Mood normal.         Behavior: Behavior normal.       Significant Labs:   Microbiology Results (last 7 days)       Procedure Component Value Units Date/Time    Blood culture [[id number]] Collected: 04/07/22 1954    Order Status: Completed Specimen: Blood from Peripheral, Antecubital, Right Updated: 04/10/22 0303     Blood Culture, Routine No Growth to date      No Growth to date      No Growth to date    Blood culture [[id number]] Collected: 04/07/22 2014    Order Status: Completed Specimen: Blood from Peripheral, Antecubital, Left Updated: 04/10/22 0303     Blood Culture, Routine No Growth to date      No Growth to date      No Growth to date    Aerobic culture [[id number]] Collected: 04/09/22 1857    Order Status: Sent Specimen: Biopsy from Leg, Right Updated: 04/09/22 1958    Fungus culture [[id number]] Collected: 04/09/22 1857    Order Status: Sent Specimen: Biopsy from Leg, Right Updated: 04/09/22 1957    AFB Culture & Smear [[id number]] Collected: 04/09/22 1857    Order Status: Sent Specimen: Biopsy from Leg, Right Updated: 04/09/22 1957    Culture, Anaerobe [[id number]] Collected: 04/09/22 1857    Order Status: Sent Specimen: Biopsy from Leg, Right Updated: 04/09/22 1952            Significant Imaging: I have reviewed all pertinent imaging results/findings within the past 24 hours.

## 2022-04-10 NOTE — SUBJECTIVE & OBJECTIVE
This encounter was provided through telemedicine.  Patient was transferred to the telemedicine service on:  04/10/2022   The patient location is: 624/624 A admitted 4/7/2022  6:46 PM.  Present with the patient at the time of the telemed/virtual assessment: Telepresenter    Interval History/Overnight Events:   Clinical record since admit reviewed.  She feels the redness surrounding her wound is improved. Punch bx done yesterday by derm - ID and wound care consults pending    Patient's father at bedside.    Review of Systems   Constitutional:  Negative for activity change, appetite change and fever.   Respiratory:  Negative for shortness of breath.    Cardiovascular:  Negative for chest pain.   Gastrointestinal:  Negative for diarrhea and vomiting.   Skin:  Positive for wound.      Inpatient Medications:  Scheduled Meds:   citalopram  40 mg Oral Daily    mupirocin   Nasal BID    piperacillin-tazobactam (ZOSYN) IVPB  4.5 g Intravenous Q8H    vancomycin (VANCOCIN) IVPB  1,500 mg Intravenous Q12H     Continuous Infusions:   sodium chloride 0.9% 150 mL/hr at 04/09/22 1929     PRN Meds:.calcium carbonate, diphenhydrAMINE, HYDROcodone-acetaminophen, melatonin, morphine, prochlorperazine, senna, simethicone, Pharmacy to dose Vancomycin consult **AND** vancomycin - pharmacy to dose      Objective:     Temp:  [98.1 °F (36.7 °C)-98.6 °F (37 °C)] 98.6 °F (37 °C)  Pulse:  [] 88  Resp:  [18-20] 18  SpO2:  [93 %-97 %] 94 %  BP: ()/(54-74) 98/54    No intake or output data in the 24 hours ending 04/10/22 1046     Body mass index is 32.66 kg/m².    Physical Exam  Vitals and nursing note reviewed.   Constitutional:       General: She is not in acute distress.     Appearance: Normal appearance.   HENT:      Head: Normocephalic and atraumatic.   Eyes:      General: No scleral icterus.        Right eye: No discharge.         Left eye: No discharge.      Extraocular Movements: Extraocular movements intact.   Cardiovascular:       Rate and Rhythm: Normal rate.   Pulmonary:      Effort: Pulmonary effort is normal. No tachypnea or respiratory distress.   Skin:     Comments: Photos of right leg reviewed.   Neurological:      General: No focal deficit present.      Mental Status: She is alert and oriented to person, place, and time.      Cranial Nerves: No cranial nerve deficit.      Motor: No weakness.   Psychiatric:         Attention and Perception: Attention normal.         Mood and Affect: Mood and affect normal.         Speech: Speech normal.         Behavior: Behavior is cooperative.         Thought Content: Thought content normal.        Labs:  Recent Results (from the past 24 hour(s))   VANCOMYCIN, TROUGH    Collection Time: 04/09/22  2:09 PM   Result Value Ref Range    Vancomycin-Trough 10.6 10.0 - 22.0 ug/mL   Phosphorus    Collection Time: 04/10/22  4:23 AM   Result Value Ref Range    Phosphorus 2.6 (L) 2.7 - 4.5 mg/dL   Magnesium    Collection Time: 04/10/22  4:23 AM   Result Value Ref Range    Magnesium 1.7 1.6 - 2.6 mg/dL   Comprehensive Metabolic Panel    Collection Time: 04/10/22  4:23 AM   Result Value Ref Range    Sodium 143 136 - 145 mmol/L    Potassium 3.9 3.5 - 5.1 mmol/L    Chloride 107 95 - 110 mmol/L    CO2 25 23 - 29 mmol/L    Glucose 89 70 - 110 mg/dL    BUN 3 (L) 6 - 20 mg/dL    Creatinine 0.9 0.5 - 1.4 mg/dL    Calcium 8.8 8.7 - 10.5 mg/dL    Total Protein 6.1 6.0 - 8.4 g/dL    Albumin 2.7 (L) 3.5 - 5.2 g/dL    Total Bilirubin 0.6 0.1 - 1.0 mg/dL    Alkaline Phosphatase 87 55 - 135 U/L    AST 18 10 - 40 U/L    ALT 14 10 - 44 U/L    Anion Gap 11 8 - 16 mmol/L    eGFR if African American >60.0 >60 mL/min/1.73 m^2    eGFR if non African American >60.0 >60 mL/min/1.73 m^2   CBC Auto Differential    Collection Time: 04/10/22  4:23 AM   Result Value Ref Range    WBC 9.67 3.90 - 12.70 K/uL    RBC 4.62 4.00 - 5.40 M/uL    Hemoglobin 12.7 12.0 - 16.0 g/dL    Hematocrit 39.6 37.0 - 48.5 %    MCV 86 82 - 98 fL    MCH 27.5  27.0 - 31.0 pg    MCHC 32.1 32.0 - 36.0 g/dL    RDW 12.6 11.5 - 14.5 %    Platelets 316 150 - 450 K/uL    MPV 9.9 9.2 - 12.9 fL    Immature Granulocytes 0.3 0.0 - 0.5 %    Gran # (ANC) 7.5 1.8 - 7.7 K/uL    Immature Grans (Abs) 0.03 0.00 - 0.04 K/uL    Lymph # 0.9 (L) 1.0 - 4.8 K/uL    Mono # 0.9 0.3 - 1.0 K/uL    Eos # 0.3 0.0 - 0.5 K/uL    Baso # 0.05 0.00 - 0.20 K/uL    nRBC 0 0 /100 WBC    Gran % 77.5 (H) 38.0 - 73.0 %    Lymph % 9.6 (L) 18.0 - 48.0 %    Mono % 8.8 4.0 - 15.0 %    Eosinophil % 3.3 0.0 - 8.0 %    Basophil % 0.5 0.0 - 1.9 %    Differential Method Automated         Lab Results   Component Value Date    [id number] Negative 04/07/2022       Recent Labs   Lab 04/08/22  0404 04/09/22  0338 04/10/22  0423   WBC 11.84 11.07 9.67   LYMPH 16.4*  1.9 20.1  2.2 9.6*  0.9*   HGB 12.4 12.5 12.7   HCT 38.1 37.4 39.6    318 316     Recent Labs   Lab 04/08/22  0404 04/09/22  0338 04/10/22  0423    137 143   K 3.9 2.9* 3.9   * 103 107   CO2 18* 23 25   BUN 6 3* 3*   CREATININE 0.6 0.7 0.9   GLU 85 90 89   CALCIUM 7.8* 8.6* 8.8   MG  --   --  1.7   PHOS  --   --  2.6*     Recent Labs   Lab 04/08/22  0404 04/09/22  0338 04/10/22  0423   ALKPHOS 63 83 87   ALT 17 14 14   AST 17 16 18   ALBUMIN 2.5* 2.5* 2.7*   PROT 5.8* 6.2 6.1   BILITOT 1.0 0.9 0.6        Recent Labs     04/09/22  0338   CRP 84.6*       All labs within the last 24 hours were reviewed.     Microbiology:  Microbiology Results (last 7 days)       Procedure Component Value Units Date/Time    Blood culture [[id number]] Collected: 04/07/22 1954    Order Status: Completed Specimen: Blood from Peripheral, Antecubital, Right Updated: 04/10/22 0303     Blood Culture, Routine No Growth to date      No Growth to date      No Growth to date    Blood culture [[id number]] Collected: 04/07/22 2014    Order Status: Completed Specimen: Blood from Peripheral, Antecubital, Left Updated: 04/10/22 0303     Blood Culture, Routine No Growth to date       No Growth to date      No Growth to date    Aerobic culture [[id number]] Collected: 04/09/22 1857    Order Status: Sent Specimen: Biopsy from Leg, Right Updated: 04/09/22 1958    Fungus culture [[id number]] Collected: 04/09/22 1857    Order Status: Sent Specimen: Biopsy from Leg, Right Updated: 04/09/22 1957    AFB Culture & Smear [[id number]] Collected: 04/09/22 1857    Order Status: Sent Specimen: Biopsy from Leg, Right Updated: 04/09/22 1957    Culture, Anaerobe [[id number]] Collected: 04/09/22 1857    Order Status: Sent Specimen: Biopsy from Leg, Right Updated: 04/09/22 1952              Imaging      No results found for this or any previous visit.      X-Ray Tibia Fibula 2 View Right  Narrative: EXAMINATION:  XR TIBIA FIBULA 2 VIEW RIGHT    CLINICAL HISTORY:  Right lower extremity cellulitis;    TECHNIQUE:  AP and lateral views of the right tibia and fibula were performed.    COMPARISON:  None.    FINDINGS:  No evidence of acute displaced fracture, dislocation, or osseous destructive process.  There is soft tissue swelling.  No radiopaque retained foreign body seen.  Impression: No acute osseous abnormality identified.    Electronically signed by: Milady Taylor MD  Date:    04/07/2022  Time:    21:58      All imaging within the last 24 hours was reviewed.       Discharge Planning   AFSHIN: 4/12/2022     Code Status: Full Code   Is the patient medically ready for discharge?: No    Reason for patient still in hospital (select all that apply): Patient trending condition, Laboratory test, Treatment, and Consult recommendations

## 2022-04-10 NOTE — ASSESSMENT & PLAN NOTE
27 y/o w/ UC (s/p colectomy w/ ileostomy) and previous extensive PG in 2019 presents with new onset worsening ulceration to RLE with likely surrounding cellulitis. Suspicion for PG is high.     Differential includes infectious causes vs. PG vs. Panniculitis   -2 punch biopsies performed today - 1 for H&E and 1 for cx (aerobic and anaerobic, fungal, AFB)   -patient required Ativan to tolerate procedure   -Biopsy will determine treatment regimen.   -Antibiotics per primary team     After informed written consent was obtained, using alcohol for cleansing and 1% Lidocaine with epinephrine for anesthetic, with sterile technique 4 mm punch biopsy was used to obtain a biopsy specimen of the lesion. Hemostasis was obtained by pressure and wound was packed with gel foam. Antibiotic dressing is applied, and wound care instructions provided. Be alert for any signs of cutaneous infection. The specimen is labeled and sent to pathology for evaluation.

## 2022-04-10 NOTE — CONSULTS
[facility]  Dermatology  Consult Note    Patient Name: Lorelei Rodriguez  MRN: [id number]  Admission Date: 4/7/2022  Hospital Length of Stay: 2 days  Attending Physician: Melvin Sandoval MD  Primary Care Provider: Batool Hinton MD     Inpatient consult to Dermatology  Consult performed by: Vandana Moraes MD  Consult ordered by: Ortiz Ford MD        Subjective:     Principal Problem:Pyoderma gangrenosum    HPI:  25 y/o F w/ hx of UC (s/p total colectomy with ileostomy, not on current therapy) and pyoderma gangrenosum in 2019. Per patient she was diagnosed with UC in 2018 and a few weeks later had a total colectomy. Patient then had PG in 2019 which was on legs, chest, and face and resolved within a couple months. She was on high dose steroids at that time and was not on any other systemic therapies.   The current skin lesion on RLE started about 2 weeks ago and she completed a course of Doxyxycline which did not improve the lesion. She started noticed it rapidly worsened in the past 4-5 days, and on 4/7 it started to ooze and bleed. She went to the ER on 4/7 at an OSH and was hypotensive which improved with fluid boluses. Patient had a fever of 100.5 on 4/7. She was transferred to [facility] for further evaluation.   Denies fevers, chills, or any other skin lesions.      Past Medical History:   Diagnosis Date    Pyoderma gangrenosa     UC (ulcerative colitis)        Past Surgical History:   Procedure Laterality Date    ILEOSTOMY      SINUS SURGERY       Family History    None       Tobacco Use    Smoking status: Never Smoker    Smokeless tobacco: Never Used   Substance and Sexual Activity    Alcohol use: Yes     Comment: RARELY    Drug use: Never    Sexual activity: Not on file       Review of patient's allergies indicates:   Allergen Reactions    Nickel sutures [surgical stainless steel]     Nickel Rash       Medications:  Continuous Infusions:   sodium chloride 0.9% 150 mL/hr at  04/09/22 1929     Scheduled Meds:   citalopram  40 mg Oral Daily    morphine  4 mg Intravenous Once    mupirocin   Nasal BID    piperacillin-tazobactam (ZOSYN) IVPB  4.5 g Intravenous Q8H    potassium chloride  40 mEq Oral BID    vancomycin (VANCOCIN) IVPB  1,500 mg Intravenous Q12H     PRN Meds:calcium carbonate, diphenhydrAMINE, HYDROcodone-acetaminophen, melatonin, morphine, prochlorperazine, senna, simethicone, Pharmacy to dose Vancomycin consult **AND** vancomycin - pharmacy to dose    Review of Systems   Constitutional:  Negative for fever, chills and fatigue.   HENT:  Negative for mouth sores.    Eyes:  Negative for eye irritation.   Respiratory:  Negative for shortness of breath.    Gastrointestinal:  Negative for nausea and vomiting.   Skin:         + skin ulceration with oozing and bleeding to RLE     Objective:     Vital Signs (Most Recent):  Temp: 98.3 °F (36.8 °C) (04/09/22 1923)  Pulse: 98 (04/09/22 1923)  Resp: 18 (04/09/22 1923)  BP: 109/69 (04/09/22 1923)  SpO2: 96 % (04/09/22 1923) Vital Signs (24h Range):  Temp:  [97.6 °F (36.4 °C)-98.4 °F (36.9 °C)] 98.3 °F (36.8 °C)  Pulse:  [] 98  Resp:  [18-20] 18  SpO2:  [93 %-99 %] 96 %  BP: ()/(52-72) 109/69     Weight: 78.4 kg (172 lb 13.5 oz)  Body mass index is 32.66 kg/m².    Physical Exam    Hemorrhagic skin ulceration to RLE with area of surrounding erythema; exquisitely tender to palpation         Significant Labs: All pertinent labs within the past 24 hours have been reviewed.    Significant Imaging: I have reviewed all pertinent imaging results/findings within the past 24 hours.      Assessment/Plan:     * Pyoderma gangrenosum  27 y/o w/ UC (s/p colectomy w/ ileostomy) and previous extensive PG in 2019 presents with new onset worsening ulceration to RLE with likely surrounding cellulitis. Suspicion for PG is high.     Differential includes infectious causes vs. PG vs. Panniculitis   -2 punch biopsies performed today - 1 for H&E  and 1 for cx (aerobic and anaerobic, fungal, AFB)   -patient required Ativan to tolerate procedure   -Biopsy will determine treatment regimen.   -Antibiotics per primary team     After informed written consent was obtained, using alcohol for cleansing and 1% Lidocaine with epinephrine for anesthetic, with sterile technique 4 mm punch biopsy was used to obtain a biopsy specimen of the lesion. Hemostasis was obtained by pressure and wound was packed with gel foam. Antibiotic dressing is applied, and wound care instructions provided. Be alert for any signs of cutaneous infection. The specimen is labeled and sent to pathology for evaluation.             Thank you for your consult. I will follow-up results of the biopsy and reach out regarding change in treatment plan. Please contact us if you have any additional questions.    Vandana Moraes MD  Dermatology  [facility] - Med Surg

## 2022-04-10 NOTE — SUBJECTIVE & OBJECTIVE
Past Medical History:   Diagnosis Date    Pyoderma gangrenosa     UC (ulcerative colitis)        Past Surgical History:   Procedure Laterality Date    ILEOSTOMY      SINUS SURGERY       Family History    None       Tobacco Use    Smoking status: Never Smoker    Smokeless tobacco: Never Used   Substance and Sexual Activity    Alcohol use: Yes     Comment: RARELY    Drug use: Never    Sexual activity: Not on file       Review of patient's allergies indicates:   Allergen Reactions    Nickel sutures [surgical stainless steel]     Nickel Rash       Medications:  Continuous Infusions:   sodium chloride 0.9% 150 mL/hr at 04/09/22 1929     Scheduled Meds:   citalopram  40 mg Oral Daily    morphine  4 mg Intravenous Once    mupirocin   Nasal BID    piperacillin-tazobactam (ZOSYN) IVPB  4.5 g Intravenous Q8H    potassium chloride  40 mEq Oral BID    vancomycin (VANCOCIN) IVPB  1,500 mg Intravenous Q12H     PRN Meds:calcium carbonate, diphenhydrAMINE, HYDROcodone-acetaminophen, melatonin, morphine, prochlorperazine, senna, simethicone, Pharmacy to dose Vancomycin consult **AND** vancomycin - pharmacy to dose    Review of Systems   Constitutional:  Negative for fever, chills and fatigue.   HENT:  Negative for mouth sores.    Eyes:  Negative for eye irritation.   Respiratory:  Negative for shortness of breath.    Gastrointestinal:  Negative for nausea and vomiting.   Skin:         + skin ulceration with oozing and bleeding to RLE     Objective:     Vital Signs (Most Recent):  Temp: 98.3 °F (36.8 °C) (04/09/22 1923)  Pulse: 98 (04/09/22 1923)  Resp: 18 (04/09/22 1923)  BP: 109/69 (04/09/22 1923)  SpO2: 96 % (04/09/22 1923) Vital Signs (24h Range):  Temp:  [97.6 °F (36.4 °C)-98.4 °F (36.9 °C)] 98.3 °F (36.8 °C)  Pulse:  [] 98  Resp:  [18-20] 18  SpO2:  [93 %-99 %] 96 %  BP: ()/(52-72) 109/69     Weight: 78.4 kg (172 lb 13.5 oz)  Body mass index is 32.66 kg/m².    Physical Exam    Hemorrhagic skin ulceration to RLE with  area of surrounding erythema; exquisitely tender to palpation         Significant Labs: All pertinent labs within the past 24 hours have been reviewed.    Significant Imaging: I have reviewed all pertinent imaging results/findings within the past 24 hours.

## 2022-04-10 NOTE — PLAN OF CARE
Patient alert and able to voice needs. No sign of distress. Respirations even and unlabored. Family at bedside. Colostomy intact and functional. C/O of generalized pain. Prn pain meds administered as ordered. No other complaints. Call light in reach.

## 2022-04-10 NOTE — ASSESSMENT & PLAN NOTE
Improving erythema/swelling since initiation of abx per pt - clinically wounds appear to be c/w PG. S/p skin biopsy, path/cx pending.     Recommendations:  -stop zosyn to decrease risk of ROSALINO with concomitant vanco  -start ceftriaxone 2g iv daily and continue vanco pharm to dose  -anticipate 7d course total for SSTI given reported rapid improvement since admission, diego 4/13  -if lesion appears to worsen, consider CT of RLE to evaluate for deeper infection  -PG mgmt per derm

## 2022-04-10 NOTE — HPI
"25 yo female with UC s/p total colectomy with ileostomy and PG admitted for derm evaluation/skin biopsy. 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 Pt was initially admitted to [facility] with reported 2w hx of RLE wound with drainage. She was initially febrile on admission with Tmax 100.5, though now hemodynamically stable, afebrile and without leukocytosis. She was started on vanc/zosyn. Xray of RLE noted to have soft tissue swelling, no osseous destruction. S/p biopsy 4/9 with derm - path/cx in process. Of note, pt reported completing a prior course of doxycycline without improvement in wound. Denies allergies to abx. Reported last episode of PG (on UE/LE) was in 2019. Denies toxic habits or TB exposure. Family hx of eczema.   "

## 2022-04-10 NOTE — SUBJECTIVE & OBJECTIVE
"Past Medical History:   Diagnosis Date    Pyoderma gangrenosa     UC (ulcerative colitis)        Past Surgical History:   Procedure Laterality Date    ILEOSTOMY      SINUS SURGERY         Review of patient's allergies indicates:   Allergen Reactions    Nickel sutures [surgical stainless steel]     Nickel Rash       Medications:  Medications Prior to Admission   Medication Sig    amoxicillin-clavulanate 875-125mg (AUGMENTIN) 875-125 mg per tablet Take 1 tablet by mouth 2 (two) times daily. for 10 days    citalopram (CELEXA) 40 MG tablet Take 40 mg by mouth once daily.    mupirocin (BACTROBAN) 2 % ointment Apply topically 2 (two) times daily. for 10 days     Antibiotics (From admission, onward)                Start     Stop Route Frequency Ordered    04/09/22 1700  vancomycin 1.5 g in dextrose 5 % 250 mL IVPB (ready to mix)         -- IV Every 12 hours (non-standard times) 04/09/22 1545    04/08/22 2015  piperacillin-tazobactam 4.5 g in sodium chloride 0.9% 100 mL IVPB (ready to mix system)         -- IV Every 8 hours (non-standard times) 04/08/22 1934    04/08/22 1100  mupirocin 2 % ointment         04/13 0859 Nasl 2 times daily 04/08/22 0954    04/08/22 0052  vancomycin - pharmacy to dose  (vancomycin IVPB)        "And" Linked Group Details    -- IV pharmacy to manage frequency 04/08/22 0052          Antifungals (From admission, onward)                None          Antivirals (From admission, onward)      None               There is no immunization history on file for this patient.    Family History    None       Social History     Socioeconomic History    Marital status: Single   Tobacco Use    Smoking status: Never Smoker    Smokeless tobacco: Never Used   Substance and Sexual Activity    Alcohol use: Yes     Comment: RARELY    Drug use: Never     Review of Systems   Constitutional:  Negative for chills and fever.   Skin:  Positive for rash and wound.   All other systems reviewed and are negative.  Objective: "     Vital Signs (Most Recent):  Temp: 98.4 °F (36.9 °C) (04/10/22 0413)  Pulse: (!) 113 (04/10/22 0413)  Resp: 18 (Simultaneous filing. User may not have seen previous data.) (04/10/22 0413)  BP: 115/74 (04/10/22 0413)  SpO2: 96 % (04/10/22 0413)   Vital Signs (24h Range):  Temp:  [98.1 °F (36.7 °C)-98.4 °F (36.9 °C)] 98.4 °F (36.9 °C)  Pulse:  [] 113  Resp:  [18-20] 18  SpO2:  [93 %-97 %] 96 %  BP: (101-115)/(63-74) 115/74     Weight: 78.4 kg (172 lb 13.5 oz)  Body mass index is 32.66 kg/m².    Estimated Creatinine Clearance: 89.7 mL/min (based on SCr of 0.9 mg/dL).    Physical Exam  Constitutional:       General: She is not in acute distress.     Appearance: She is not ill-appearing or toxic-appearing.   HENT:      Head: Normocephalic and atraumatic.      Right Ear: External ear normal.      Left Ear: External ear normal.      Mouth/Throat:      Mouth: Mucous membranes are moist.      Pharynx: Oropharynx is clear. No oropharyngeal exudate or posterior oropharyngeal erythema.   Eyes:      General: No scleral icterus.        Right eye: No discharge.         Left eye: No discharge.   Cardiovascular:      Rate and Rhythm: Normal rate and regular rhythm.   Pulmonary:      Effort: Pulmonary effort is normal. No respiratory distress.      Breath sounds: No stridor. No wheezing or rhonchi.   Abdominal:      General: Bowel sounds are normal. There is no distension.      Palpations: Abdomen is soft.      Tenderness: There is no abdominal tenderness. There is no guarding.      Comments: Ileostomy with formed stool   Musculoskeletal:         General: Tenderness (around RLE wound) present.      Right lower leg: No edema.      Left lower leg: No edema.   Skin:     General: Skin is warm and dry.      Findings: Erythema (improving) and lesion present.   Neurological:      Mental Status: She is alert and oriented to person, place, and time. Mental status is at baseline.      Motor: No weakness.   Psychiatric:         Mood  and Affect: Mood normal.         Behavior: Behavior normal.       Significant Labs:   Microbiology Results (last 7 days)       Procedure Component Value Units Date/Time    Blood culture [[id number]] Collected: 04/07/22 1954    Order Status: Completed Specimen: Blood from Peripheral, Antecubital, Right Updated: 04/10/22 0303     Blood Culture, Routine No Growth to date      No Growth to date      No Growth to date    Blood culture [[id number]] Collected: 04/07/22 2014    Order Status: Completed Specimen: Blood from Peripheral, Antecubital, Left Updated: 04/10/22 0303     Blood Culture, Routine No Growth to date      No Growth to date      No Growth to date    Aerobic culture [[id number]] Collected: 04/09/22 1857    Order Status: Sent Specimen: Biopsy from Leg, Right Updated: 04/09/22 1958    Fungus culture [[id number]] Collected: 04/09/22 1857    Order Status: Sent Specimen: Biopsy from Leg, Right Updated: 04/09/22 1957    AFB Culture & Smear [[id number]] Collected: 04/09/22 1857    Order Status: Sent Specimen: Biopsy from Leg, Right Updated: 04/09/22 1957    Culture, Anaerobe [[id number]] Collected: 04/09/22 1857    Order Status: Sent Specimen: Biopsy from Leg, Right Updated: 04/09/22 1952            Significant Imaging: I have reviewed all pertinent imaging results/findings within the past 24 hours.

## 2022-04-11 LAB
ALBUMIN SERPL BCP-MCNC: 2.4 G/DL (ref 3.5–5.2)
ALP SERPL-CCNC: 91 U/L (ref 55–135)
ALT SERPL W/O P-5'-P-CCNC: 12 U/L (ref 10–44)
ANION GAP SERPL CALC-SCNC: 11 MMOL/L (ref 8–16)
AST SERPL-CCNC: 18 U/L (ref 10–40)
BASOPHILS # BLD AUTO: 0.05 K/UL (ref 0–0.2)
BASOPHILS NFR BLD: 0.5 % (ref 0–1.9)
BILIRUB SERPL-MCNC: 0.5 MG/DL (ref 0.1–1)
BUN SERPL-MCNC: 3 MG/DL (ref 6–20)
CALCIUM SERPL-MCNC: 9.1 MG/DL (ref 8.7–10.5)
CHLORIDE SERPL-SCNC: 106 MMOL/L (ref 95–110)
CO2 SERPL-SCNC: 26 MMOL/L (ref 23–29)
CREAT SERPL-MCNC: 1 MG/DL (ref 0.5–1.4)
DIFFERENTIAL METHOD: ABNORMAL
EOSINOPHIL # BLD AUTO: 0.4 K/UL (ref 0–0.5)
EOSINOPHIL NFR BLD: 4.5 % (ref 0–8)
ERYTHROCYTE [DISTWIDTH] IN BLOOD BY AUTOMATED COUNT: 12.3 % (ref 11.5–14.5)
EST. GFR  (AFRICAN AMERICAN): >60 ML/MIN/1.73 M^2
EST. GFR  (NON AFRICAN AMERICAN): >60 ML/MIN/1.73 M^2
GLUCOSE SERPL-MCNC: 89 MG/DL (ref 70–110)
HAV IGG SER QL IA: NEGATIVE
HBV CORE AB SERPL QL IA: NEGATIVE
HBV SURFACE AB SER-ACNC: POSITIVE M[IU]/ML
HBV SURFACE AG SERPL QL IA: NEGATIVE
HCT VFR BLD AUTO: 36.1 % (ref 37–48.5)
HCV AB SERPL QL IA: NEGATIVE
HGB BLD-MCNC: 11.7 G/DL (ref 12–16)
IMM GRANULOCYTES # BLD AUTO: 0.02 K/UL (ref 0–0.04)
IMM GRANULOCYTES NFR BLD AUTO: 0.2 % (ref 0–0.5)
LYMPHOCYTES # BLD AUTO: 1.4 K/UL (ref 1–4.8)
LYMPHOCYTES NFR BLD: 14.8 % (ref 18–48)
MAGNESIUM SERPL-MCNC: 1.6 MG/DL (ref 1.6–2.6)
MCH RBC QN AUTO: 27.1 PG (ref 27–31)
MCHC RBC AUTO-ENTMCNC: 32.4 G/DL (ref 32–36)
MCV RBC AUTO: 84 FL (ref 82–98)
MONOCYTES # BLD AUTO: 0.9 K/UL (ref 0.3–1)
MONOCYTES NFR BLD: 9.8 % (ref 4–15)
NEUTROPHILS # BLD AUTO: 6.7 K/UL (ref 1.8–7.7)
NEUTROPHILS NFR BLD: 70.2 % (ref 38–73)
NRBC BLD-RTO: 0 /100 WBC
PHOSPHATE SERPL-MCNC: 3.2 MG/DL (ref 2.7–4.5)
PLATELET # BLD AUTO: 303 K/UL (ref 150–450)
PMV BLD AUTO: 9.4 FL (ref 9.2–12.9)
POTASSIUM SERPL-SCNC: 2.9 MMOL/L (ref 3.5–5.1)
PROT SERPL-MCNC: 6.4 G/DL (ref 6–8.4)
RBC # BLD AUTO: 4.31 M/UL (ref 4–5.4)
SODIUM SERPL-SCNC: 143 MMOL/L (ref 136–145)
VANCOMYCIN TROUGH SERPL-MCNC: 22.8 UG/ML (ref 10–22)
WBC # BLD AUTO: 9.51 K/UL (ref 3.9–12.7)

## 2022-04-11 PROCEDURE — 63600175 PHARM REV CODE 636 W HCPCS: Performed by: INTERNAL MEDICINE

## 2022-04-11 PROCEDURE — 99233 SBSQ HOSP IP/OBS HIGH 50: CPT | Mod: 95,,, | Performed by: INTERNAL MEDICINE

## 2022-04-11 PROCEDURE — 80053 COMPREHEN METABOLIC PANEL: CPT | Performed by: STUDENT IN AN ORGANIZED HEALTH CARE EDUCATION/TRAINING PROGRAM

## 2022-04-11 PROCEDURE — 25000003 PHARM REV CODE 250: Performed by: INTERNAL MEDICINE

## 2022-04-11 PROCEDURE — 11000001 HC ACUTE MED/SURG PRIVATE ROOM

## 2022-04-11 PROCEDURE — 85025 COMPLETE CBC W/AUTO DIFF WBC: CPT | Performed by: STUDENT IN AN ORGANIZED HEALTH CARE EDUCATION/TRAINING PROGRAM

## 2022-04-11 PROCEDURE — 80202 ASSAY OF VANCOMYCIN: CPT | Performed by: STUDENT IN AN ORGANIZED HEALTH CARE EDUCATION/TRAINING PROGRAM

## 2022-04-11 PROCEDURE — 36415 COLL VENOUS BLD VENIPUNCTURE: CPT | Performed by: STUDENT IN AN ORGANIZED HEALTH CARE EDUCATION/TRAINING PROGRAM

## 2022-04-11 PROCEDURE — 84100 ASSAY OF PHOSPHORUS: CPT | Performed by: STUDENT IN AN ORGANIZED HEALTH CARE EDUCATION/TRAINING PROGRAM

## 2022-04-11 PROCEDURE — 99233 PR SUBSEQUENT HOSPITAL CARE,LEVL III: ICD-10-PCS | Mod: 95,,, | Performed by: INTERNAL MEDICINE

## 2022-04-11 PROCEDURE — 25000003 PHARM REV CODE 250: Performed by: STUDENT IN AN ORGANIZED HEALTH CARE EDUCATION/TRAINING PROGRAM

## 2022-04-11 PROCEDURE — 63600175 PHARM REV CODE 636 W HCPCS: Performed by: STUDENT IN AN ORGANIZED HEALTH CARE EDUCATION/TRAINING PROGRAM

## 2022-04-11 PROCEDURE — 83735 ASSAY OF MAGNESIUM: CPT | Performed by: STUDENT IN AN ORGANIZED HEALTH CARE EDUCATION/TRAINING PROGRAM

## 2022-04-11 RX ORDER — POTASSIUM CHLORIDE 20 MEQ/1
40 TABLET, EXTENDED RELEASE ORAL EVERY 4 HOURS
Status: COMPLETED | OUTPATIENT
Start: 2022-04-11 | End: 2022-04-11

## 2022-04-11 RX ORDER — VANCOMYCIN HCL IN 5 % DEXTROSE 1G/250ML
1000 PLASTIC BAG, INJECTION (ML) INTRAVENOUS
Status: DISCONTINUED | OUTPATIENT
Start: 2022-04-11 | End: 2022-04-11

## 2022-04-11 RX ORDER — ENOXAPARIN SODIUM 100 MG/ML
40 INJECTION SUBCUTANEOUS EVERY 24 HOURS
Status: DISCONTINUED | OUTPATIENT
Start: 2022-04-11 | End: 2022-04-12

## 2022-04-11 RX ADMIN — PROCHLORPERAZINE EDISYLATE 5 MG: 5 INJECTION INTRAMUSCULAR; INTRAVENOUS at 02:04

## 2022-04-11 RX ADMIN — HYDROCODONE BITARTRATE AND ACETAMINOPHEN 1 TABLET: 5; 325 TABLET ORAL at 11:04

## 2022-04-11 RX ADMIN — POTASSIUM CHLORIDE 40 MEQ: 20 TABLET, EXTENDED RELEASE ORAL at 09:04

## 2022-04-11 RX ADMIN — CEFTRIAXONE 2 G: 2 INJECTION, SOLUTION INTRAVENOUS at 02:04

## 2022-04-11 RX ADMIN — MORPHINE SULFATE 4 MG: 4 INJECTION INTRAVENOUS at 02:04

## 2022-04-11 RX ADMIN — CITALOPRAM HYDROBROMIDE 40 MG: 20 TABLET ORAL at 08:04

## 2022-04-11 RX ADMIN — POTASSIUM CHLORIDE 40 MEQ: 20 TABLET, EXTENDED RELEASE ORAL at 02:04

## 2022-04-11 RX ADMIN — HYDROCODONE BITARTRATE AND ACETAMINOPHEN 1 TABLET: 5; 325 TABLET ORAL at 08:04

## 2022-04-11 RX ADMIN — HYDROCODONE BITARTRATE AND ACETAMINOPHEN 1 TABLET: 5; 325 TABLET ORAL at 05:04

## 2022-04-11 RX ADMIN — HYDROCODONE BITARTRATE AND ACETAMINOPHEN 1 TABLET: 5; 325 TABLET ORAL at 04:04

## 2022-04-11 RX ADMIN — ENOXAPARIN SODIUM 40 MG: 100 INJECTION SUBCUTANEOUS at 06:04

## 2022-04-11 RX ADMIN — VANCOMYCIN HYDROCHLORIDE 1500 MG: 1.5 INJECTION, POWDER, LYOPHILIZED, FOR SOLUTION INTRAVENOUS at 04:04

## 2022-04-11 NOTE — ASSESSMENT & PLAN NOTE
- Interval history and physical exam findings as described above  - Does appear to have history of less severe ulcerative skin lesions  - Not on any immune-modifying therapy  - Treating for possible overlying cellulitis as below  - Dermatology consulted, appreciate assistance - punch biopsy done  - Wound care consulted  - PRN analgesics provided  - Continuing to monitor

## 2022-04-11 NOTE — SUBJECTIVE & OBJECTIVE
This encounter was provided through telemedicine.  Patient was transferred to the telemedicine service on:  04/10/2022   The patient location is: 4/624 A admitted 4/7/2022  6:46 PM.  Present with the patient at the time of the telemed/virtual assessment: Telepresenter    Interval History/Overnight Events:     No complaints - IV infiltrated but able to be replaced; right arm (previous IV site);ultrasound UE shows superficial thrombophlebitis and occlusion of her right cephalic vein; her leg wound has decreased erythema surrounding but remains with drainage    Patient's mother at bedside.    Review of Systems   Constitutional:  Negative for activity change, appetite change and fever.   Respiratory:  Negative for shortness of breath.    Cardiovascular:  Negative for chest pain.   Gastrointestinal:  Negative for diarrhea and vomiting.   Skin:  Positive for wound.      Inpatient Medications:  Scheduled Meds:   cefTRIAXone (ROCEPHIN) IVPB  2 g Intravenous Q24H    citalopram  40 mg Oral Daily    mupirocin   Nasal BID    potassium chloride  40 mEq Oral Q4H     Continuous Infusions:      PRN Meds:.calcium carbonate, diphenhydrAMINE, HYDROcodone-acetaminophen, melatonin, morphine, prochlorperazine, senna, simethicone, Pharmacy to dose Vancomycin consult **AND** vancomycin - pharmacy to dose      Objective:     Temp:  [96.8 °F (36 °C)-98.9 °F (37.2 °C)] 98.9 °F (37.2 °C)  Pulse:  [] 89  Resp:  [16-20] 20  SpO2:  [95 %-98 %] 95 %  BP: (105-118)/(62-72) 111/62    No intake or output data in the 24 hours ending 04/11/22 1025     Body mass index is 32.66 kg/m².    Physical Exam  Vitals and nursing note reviewed.   Constitutional:       General: She is not in acute distress.     Appearance: Normal appearance.   HENT:      Head: Normocephalic and atraumatic.   Eyes:      General: No scleral icterus.        Right eye: No discharge.         Left eye: No discharge.      Extraocular Movements: Extraocular movements intact.    Cardiovascular:      Rate and Rhythm: Normal rate.   Pulmonary:      Effort: Pulmonary effort is normal. No tachypnea or respiratory distress.   Skin:     Comments: Right leg wound with decreased erythema near previous markings ;she remains with significant bruising and redness centrally; her mother notes decreased edema from admit   Neurological:      General: No focal deficit present.      Mental Status: She is alert and oriented to person, place, and time.      Cranial Nerves: No cranial nerve deficit.      Motor: No weakness.   Psychiatric:         Attention and Perception: Attention normal.         Mood and Affect: Mood and affect normal.         Speech: Speech normal.         Behavior: Behavior is cooperative.         Thought Content: Thought content normal.        Labs:  Recent Results (from the past 24 hour(s))   Phosphorus    Collection Time: 04/11/22  3:45 AM   Result Value Ref Range    Phosphorus 3.2 2.7 - 4.5 mg/dL   Magnesium    Collection Time: 04/11/22  3:45 AM   Result Value Ref Range    Magnesium 1.6 1.6 - 2.6 mg/dL   Comprehensive Metabolic Panel    Collection Time: 04/11/22  3:45 AM   Result Value Ref Range    Sodium 143 136 - 145 mmol/L    Potassium 2.9 (L) 3.5 - 5.1 mmol/L    Chloride 106 95 - 110 mmol/L    CO2 26 23 - 29 mmol/L    Glucose 89 70 - 110 mg/dL    BUN 3 (L) 6 - 20 mg/dL    Creatinine 1.0 0.5 - 1.4 mg/dL    Calcium 9.1 8.7 - 10.5 mg/dL    Total Protein 6.4 6.0 - 8.4 g/dL    Albumin 2.4 (L) 3.5 - 5.2 g/dL    Total Bilirubin 0.5 0.1 - 1.0 mg/dL    Alkaline Phosphatase 91 55 - 135 U/L    AST 18 10 - 40 U/L    ALT 12 10 - 44 U/L    Anion Gap 11 8 - 16 mmol/L    eGFR if African American >60.0 >60 mL/min/1.73 m^2    eGFR if non African American >60.0 >60 mL/min/1.73 m^2   CBC Auto Differential    Collection Time: 04/11/22  3:45 AM   Result Value Ref Range    WBC 9.51 3.90 - 12.70 K/uL    RBC 4.31 4.00 - 5.40 M/uL    Hemoglobin 11.7 (L) 12.0 - 16.0 g/dL    Hematocrit 36.1 (L) 37.0 - 48.5  %    MCV 84 82 - 98 fL    MCH 27.1 27.0 - 31.0 pg    MCHC 32.4 32.0 - 36.0 g/dL    RDW 12.3 11.5 - 14.5 %    Platelets 303 150 - 450 K/uL    MPV 9.4 9.2 - 12.9 fL    Immature Granulocytes 0.2 0.0 - 0.5 %    Gran # (ANC) 6.7 1.8 - 7.7 K/uL    Immature Grans (Abs) 0.02 0.00 - 0.04 K/uL    Lymph # 1.4 1.0 - 4.8 K/uL    Mono # 0.9 0.3 - 1.0 K/uL    Eos # 0.4 0.0 - 0.5 K/uL    Baso # 0.05 0.00 - 0.20 K/uL    nRBC 0 0 /100 WBC    Gran % 70.2 38.0 - 73.0 %    Lymph % 14.8 (L) 18.0 - 48.0 %    Mono % 9.8 4.0 - 15.0 %    Eosinophil % 4.5 0.0 - 8.0 %    Basophil % 0.5 0.0 - 1.9 %    Differential Method Automated    VANCOMYCIN, TROUGH    Collection Time: 04/11/22  3:45 AM   Result Value Ref Range    Vancomycin-Trough 22.8 (H) 10.0 - 22.0 ug/mL        Lab Results   Component Value Date    [id number] Negative 04/07/2022       Recent Labs   Lab 04/09/22  0338 04/10/22  0423 04/11/22  0345   WBC 11.07 9.67 9.51   LYMPH 20.1  2.2 9.6*  0.9* 14.8*  1.4   HGB 12.5 12.7 11.7*   HCT 37.4 39.6 36.1*    316 303       Recent Labs   Lab 04/09/22  0338 04/10/22  0423 04/11/22  0345    143 143   K 2.9* 3.9 2.9*    107 106   CO2 23 25 26   BUN 3* 3* 3*   CREATININE 0.7 0.9 1.0   GLU 90 89 89   CALCIUM 8.6* 8.8 9.1   MG  --  1.7 1.6   PHOS  --  2.6* 3.2       Recent Labs   Lab 04/09/22  0338 04/10/22  0423 04/11/22  0345   ALKPHOS 83 87 91   ALT 14 14 12   AST 16 18 18   ALBUMIN 2.5* 2.7* 2.4*   PROT 6.2 6.1 6.4   BILITOT 0.9 0.6 0.5          Recent Labs     04/09/22 0338   CRP 84.6*         All labs within the last 24 hours were reviewed.     Microbiology:  Microbiology Results (last 7 days)       Procedure Component Value Units Date/Time    Aerobic culture [[id number]] Collected: 04/09/22 1857    Order Status: Completed Specimen: Biopsy from Leg, Right Updated: 04/11/22 0744     Aerobic Bacterial Culture No growth    Culture, Anaerobe [[id number]] Collected: 04/09/22 1857    Order Status: Completed Specimen: Biopsy  from Leg, Right Updated: 04/11/22 0738     Anaerobic Culture Culture in progress    Blood culture [[id number]] Collected: 04/07/22 2014    Order Status: Completed Specimen: Blood from Peripheral, Antecubital, Left Updated: 04/11/22 0303     Blood Culture, Routine No Growth to date      No Growth to date      No Growth to date      No Growth to date    Blood culture [[id number]] Collected: 04/07/22 1954    Order Status: Completed Specimen: Blood from Peripheral, Antecubital, Right Updated: 04/11/22 0303     Blood Culture, Routine No Growth to date      No Growth to date      No Growth to date      No Growth to date    AFB Culture & Smear [[id number]] Collected: 04/09/22 1857    Order Status: Completed Specimen: Biopsy from Leg, Right Updated: 04/10/22 2127     AFB Culture & Smear Culture in progress    Fungus culture [[id number]] Collected: 04/09/22 1857    Order Status: Sent Specimen: Biopsy from Leg, Right Updated: 04/09/22 1957              Imaging      No results found for this or any previous visit.      X-Ray Tibia Fibula 2 View Right  Narrative: EXAMINATION:  XR TIBIA FIBULA 2 VIEW RIGHT    CLINICAL HISTORY:  Right lower extremity cellulitis;    TECHNIQUE:  AP and lateral views of the right tibia and fibula were performed.    COMPARISON:  None.    FINDINGS:  No evidence of acute displaced fracture, dislocation, or osseous destructive process.  There is soft tissue swelling.  No radiopaque retained foreign body seen.  Impression: No acute osseous abnormality identified.    Electronically signed by: Milady Taylor MD  Date:    04/07/2022  Time:    21:58      All imaging within the last 24 hours was reviewed.       Discharge Planning   AFSHIN: 4/13/2022     Code Status: Full Code   Is the patient medically ready for discharge?: No    Reason for patient still in hospital (select all that apply): Patient trending condition, Laboratory test, Treatment, and Consult recommendations

## 2022-04-11 NOTE — ASSESSMENT & PLAN NOTE
- Interval history and physical exam findings as described above  - Likely pyoderma gangrenosum, though given previous fever and soft BP, concern for concurrent cellulitis  - BCx negative  - wound culture with no growth so far  - Continue empiric vanc and change Zosyn to rocephin per ID recs end date of 4/13  - change IV on 4/11 due to infiltration with superficial thrombophlebitis found

## 2022-04-11 NOTE — ASSESSMENT & PLAN NOTE
Patient without any complaints regarding her ileostomy.  She is able to independently provide care.

## 2022-04-11 NOTE — PLAN OF CARE
Patient alert and oriented. Able to voice needs. Family at bedside. No sign of distress. Respirations even and unlabored. Abdomen soft and nontender. Right arm swollen.MD notified. US to right arm. Awaiting results. C/O of right leg pain. Prn pain meds administered as ordered. No other complaints

## 2022-04-11 NOTE — PROGRESS NOTES
Pharmacokinetic Assessment Follow Up: IV Vancomycin    Vancomycin serum concentration assessment(s):    The trough level was drawn correctly and can be used to guide therapy at this time. The measurement is above the desired definitive target range of 10 to 20 mcg/mL.    Vancomycin Regimen Plan:    The patients trough resulted as supratherapeutic on vancomycin 1.5 g IV q12h  The patients serum creatinine has increased and meets criteria for ROSALINO  Will hold vancomycin today and plan on obtaining a vancomycin random level to be drawn with 4/12 AM labs  Would recommend following ID's recommendation and de-escalating zosyn to rocephin    Drug levels (last 3 results):  Recent Labs   Lab Result Units 04/09/22  1409 04/11/22  0345   Vancomycin-Trough ug/mL 10.6 22.8*       Pharmacy will continue to follow and monitor vancomycin.    Please contact pharmacy at extension [phone] for questions regarding this assessment.    Thank you for the consult,   Sandor Bautista, PharmD, [facility]      Patient brief summary:  Lorelei Rodriguez is a 26 y.o. female initiated on antimicrobial therapy with IV Vancomycin for treatment of skin & soft tissue infection    Drug Allergies:   Review of patient's allergies indicates:   Allergen Reactions    Nickel sutures [surgical stainless steel]     Nickel Rash       Actual Body Weight:   78.4 kg    Renal Function:   Estimated Creatinine Clearance: 80.8 mL/min (based on SCr of 1 mg/dL).,     Dialysis Method (if applicable):  N/A    CBC (last 72 hours):  Recent Labs   Lab Result Units 04/09/22  0338 04/10/22  0423 04/11/22  0345   WBC K/uL 11.07 9.67 9.51   Hemoglobin g/dL 12.5 12.7 11.7*   Hematocrit % 37.4 39.6 36.1*   Platelets K/uL 318 316 303   Gran % % 65.3 77.5* 70.2   Lymph % % 20.1 9.6* 14.8*   Mono % % 9.2 8.8 9.8   Eosinophil % % 4.2 3.3 4.5   Basophil % % 0.7 0.5 0.5   Differential Method  Automated Automated Automated       Metabolic Panel (last 72 hours):  Recent Labs   Lab Result Units  04/09/22  0338 04/10/22  0423 04/11/22  0345   Sodium mmol/L 137 143 143   Potassium mmol/L 2.9* 3.9 2.9*   Chloride mmol/L 103 107 106   CO2 mmol/L 23 25 26   Glucose mg/dL 90 89 89   BUN mg/dL 3* 3* 3*   Creatinine mg/dL 0.7 0.9 1.0   Albumin g/dL 2.5* 2.7* 2.4*   Total Bilirubin mg/dL 0.9 0.6 0.5   Alkaline Phosphatase U/L 83 87 91   AST U/L 16 18 18   ALT U/L 14 14 12   Magnesium mg/dL  --  1.7 1.6   Phosphorus mg/dL  --  2.6* 3.2       Vancomycin Administrations:  vancomycin given in the last 96 hours                   vancomycin 1.5 g in dextrose 5 % 250 mL IVPB (ready to mix) (mg) 1,500 mg New Bag 04/11/22 0405     1,500 mg New Bag 04/10/22 1606     1,500 mg New Bag  0414     1,500 mg New Bag 04/09/22 1724    vancomycin 1.25 g in dextrose 5% 250 mL IVPB (ready to mix) (mg) 1,250 mg New Bag 04/09/22 0124     1,250 mg New Bag 04/08/22 0915    vancomycin in dextrose 5 % 1 gram/250 mL IVPB 1,000 mg (mg) 1,000 mg New Bag 04/07/22 2055                Microbiologic Results:  Microbiology Results (last 7 days)     Procedure Component Value Units Date/Time    Blood culture [[id number]] Collected: 04/07/22 2014    Order Status: Completed Specimen: Blood from Peripheral, Antecubital, Left Updated: 04/11/22 0303     Blood Culture, Routine No Growth to date      No Growth to date      No Growth to date      No Growth to date    Blood culture [[id number]] Collected: 04/07/22 1954    Order Status: Completed Specimen: Blood from Peripheral, Antecubital, Right Updated: 04/11/22 0303     Blood Culture, Routine No Growth to date      No Growth to date      No Growth to date      No Growth to date    AFB Culture & Smear [[id number]] Collected: 04/09/22 1857    Order Status: Completed Specimen: Biopsy from Leg, Right Updated: 04/10/22 2127     AFB Culture & Smear Culture in progress    Aerobic culture [[id number]] Collected: 04/09/22 1857    Order Status: Sent Specimen: Biopsy from Leg, Right Updated: 04/09/22 1958    Fungus culture  [[id number]] Collected: 04/09/22 1857    Order Status: Sent Specimen: Biopsy from Leg, Right Updated: 04/09/22 1957    Culture, Anaerobe [[id number]] Collected: 04/09/22 1857    Order Status: Sent Specimen: Biopsy from Leg, Right Updated: 04/09/22 1952

## 2022-04-11 NOTE — PROGRESS NOTES
[facility] Medicine  Telemedicine Progress Note    Patient Name: Lorelei Rodriguez  MRN: [id number]  Patient Class: IP- Inpatient   Admission Date: 4/7/2022  Length of Stay: 3 days  Attending Physician: Tracey Wright MD  Primary Care Provider: Batool Hinton MD          Subjective:     Principal Problem:Pyoderma gangrenosum        HPI:  Lorelei Rodriguez is a 26 y.o. female with ulcerative colitis who presented initially to [facility] ED with complaints of right leg wound for the past two weeks.  She reports that the wound initially started as a red, raised lesion for which she had a telemedicine visit with her dermatologist on Mar 22, 2022.  She was prescribed a course of doxycycline with which she was compliant.  Unfortunately, it seemed to worsen and she presented to the ED on Apr 2, 2022 and was prescribed a course of amoxicillin/clavulanate.  She also felt that there was no improvement and since that the lesion started to bleed a couple days ago.  She denies any fevers other than today which was 100.5° F. She denies any trauma to her right lower extremity.  She does volunteer that this lesion looks similar to two lesions on her right forearm that was due to pyoderma gangrenosum.  She has otherwise been in her usual state of health.        Overview/Hospital Course:  Pt transferred to [facility] and arrived on 4/9. Other than RLE wound, she remains HDS and grossly asymptomatic. Dermatology consulted for assistance for likely pyoderma gangrenosum; continuing coverage with abx for possible superimposed cellulitis.        This encounter was provided through telemedicine.  Patient was transferred to the telemedicine service on:  04/10/2022   The patient location is: [address] [place] [address] A admitted 4/7/2022  6:46 PM.  Present with the patient at the time of the telemed/virtual assessment: Telepresenter    Interval History/Overnight Events:   Clinical record since admit reviewed.  She feels the redness surrounding her wound  is improved. Punch bx done yesterday by derm - ID and wound care consults pending    Patient's father at bedside.    Review of Systems   Constitutional:  Negative for activity change, appetite change and fever.   Respiratory:  Negative for shortness of breath.    Cardiovascular:  Negative for chest pain.   Gastrointestinal:  Negative for diarrhea and vomiting.   Skin:  Positive for wound.      Inpatient Medications:  Scheduled Meds:   citalopram  40 mg Oral Daily    mupirocin   Nasal BID    piperacillin-tazobactam (ZOSYN) IVPB  4.5 g Intravenous Q8H    vancomycin (VANCOCIN) IVPB  1,500 mg Intravenous Q12H     Continuous Infusions:   sodium chloride 0.9% 150 mL/hr at 04/09/22 1929     PRN Meds:.calcium carbonate, diphenhydrAMINE, HYDROcodone-acetaminophen, melatonin, morphine, prochlorperazine, senna, simethicone, Pharmacy to dose Vancomycin consult **AND** vancomycin - pharmacy to dose      Objective:     Temp:  [98.1 °F (36.7 °C)-98.6 °F (37 °C)] 98.6 °F (37 °C)  Pulse:  [] 88  Resp:  [18-20] 18  SpO2:  [93 %-97 %] 94 %  BP: ()/(54-74) 98/54    No intake or output data in the 24 hours ending 04/10/22 1046     Body mass index is 32.66 kg/m².    Physical Exam  Vitals and nursing note reviewed.   Constitutional:       General: She is not in acute distress.     Appearance: Normal appearance.   HENT:      Head: Normocephalic and atraumatic.   Eyes:      General: No scleral icterus.        Right eye: No discharge.         Left eye: No discharge.      Extraocular Movements: Extraocular movements intact.   Cardiovascular:      Rate and Rhythm: Normal rate.   Pulmonary:      Effort: Pulmonary effort is normal. No tachypnea or respiratory distress.   Skin:     Comments: Photos of right leg reviewed.   Neurological:      General: No focal deficit present.      Mental Status: She is alert and oriented to person, place, and time.      Cranial Nerves: No cranial nerve deficit.      Motor: No weakness.    Psychiatric:         Attention and Perception: Attention normal.         Mood and Affect: Mood and affect normal.         Speech: Speech normal.         Behavior: Behavior is cooperative.         Thought Content: Thought content normal.        Labs:  Recent Results (from the past 24 hour(s))   VANCOMYCIN, TROUGH    Collection Time: 04/09/22  2:09 PM   Result Value Ref Range    Vancomycin-Trough 10.6 10.0 - 22.0 ug/mL   Phosphorus    Collection Time: 04/10/22  4:23 AM   Result Value Ref Range    Phosphorus 2.6 (L) 2.7 - 4.5 mg/dL   Magnesium    Collection Time: 04/10/22  4:23 AM   Result Value Ref Range    Magnesium 1.7 1.6 - 2.6 mg/dL   Comprehensive Metabolic Panel    Collection Time: 04/10/22  4:23 AM   Result Value Ref Range    Sodium 143 136 - 145 mmol/L    Potassium 3.9 3.5 - 5.1 mmol/L    Chloride 107 95 - 110 mmol/L    CO2 25 23 - 29 mmol/L    Glucose 89 70 - 110 mg/dL    BUN 3 (L) 6 - 20 mg/dL    Creatinine 0.9 0.5 - 1.4 mg/dL    Calcium 8.8 8.7 - 10.5 mg/dL    Total Protein 6.1 6.0 - 8.4 g/dL    Albumin 2.7 (L) 3.5 - 5.2 g/dL    Total Bilirubin 0.6 0.1 - 1.0 mg/dL    Alkaline Phosphatase 87 55 - 135 U/L    AST 18 10 - 40 U/L    ALT 14 10 - 44 U/L    Anion Gap 11 8 - 16 mmol/L    eGFR if African American >60.0 >60 mL/min/1.73 m^2    eGFR if non African American >60.0 >60 mL/min/1.73 m^2   CBC Auto Differential    Collection Time: 04/10/22  4:23 AM   Result Value Ref Range    WBC 9.67 3.90 - 12.70 K/uL    RBC 4.62 4.00 - 5.40 M/uL    Hemoglobin 12.7 12.0 - 16.0 g/dL    Hematocrit 39.6 37.0 - 48.5 %    MCV 86 82 - 98 fL    MCH 27.5 27.0 - 31.0 pg    MCHC 32.1 32.0 - 36.0 g/dL    RDW 12.6 11.5 - 14.5 %    Platelets 316 150 - 450 K/uL    MPV 9.9 9.2 - 12.9 fL    Immature Granulocytes 0.3 0.0 - 0.5 %    Gran # (ANC) 7.5 1.8 - 7.7 K/uL    Immature Grans (Abs) 0.03 0.00 - 0.04 K/uL    Lymph # 0.9 (L) 1.0 - 4.8 K/uL    Mono # 0.9 0.3 - 1.0 K/uL    Eos # 0.3 0.0 - 0.5 K/uL    Baso # 0.05 0.00 - 0.20 K/uL    nRBC 0  0 /100 WBC    Gran % 77.5 (H) 38.0 - 73.0 %    Lymph % 9.6 (L) 18.0 - 48.0 %    Mono % 8.8 4.0 - 15.0 %    Eosinophil % 3.3 0.0 - 8.0 %    Basophil % 0.5 0.0 - 1.9 %    Differential Method Automated         Lab Results   Component Value Date    [id number] Negative 04/07/2022       Recent Labs   Lab 04/08/22  0404 04/09/22  0338 04/10/22  0423   WBC 11.84 11.07 9.67   LYMPH 16.4*  1.9 20.1  2.2 9.6*  0.9*   HGB 12.4 12.5 12.7   HCT 38.1 37.4 39.6    318 316     Recent Labs   Lab 04/08/22  0404 04/09/22  0338 04/10/22  0423    137 143   K 3.9 2.9* 3.9   * 103 107   CO2 18* 23 25   BUN 6 3* 3*   CREATININE 0.6 0.7 0.9   GLU 85 90 89   CALCIUM 7.8* 8.6* 8.8   MG  --   --  1.7   PHOS  --   --  2.6*     Recent Labs   Lab 04/08/22  0404 04/09/22  0338 04/10/22  0423   ALKPHOS 63 83 87   ALT 17 14 14   AST 17 16 18   ALBUMIN 2.5* 2.5* 2.7*   PROT 5.8* 6.2 6.1   BILITOT 1.0 0.9 0.6        Recent Labs     04/09/22 0338   CRP 84.6*       All labs within the last 24 hours were reviewed.     Microbiology:  Microbiology Results (last 7 days)       Procedure Component Value Units Date/Time    Blood culture [[id number]] Collected: 04/07/22 1954    Order Status: Completed Specimen: Blood from Peripheral, Antecubital, Right Updated: 04/10/22 0303     Blood Culture, Routine No Growth to date      No Growth to date      No Growth to date    Blood culture [[id number]] Collected: 04/07/22 2014    Order Status: Completed Specimen: Blood from Peripheral, Antecubital, Left Updated: 04/10/22 0303     Blood Culture, Routine No Growth to date      No Growth to date      No Growth to date    Aerobic culture [[id number]] Collected: 04/09/22 1857    Order Status: Sent Specimen: Biopsy from Leg, Right Updated: 04/09/22 1958    Fungus culture [[id number]] Collected: 04/09/22 1857    Order Status: Sent Specimen: Biopsy from Leg, Right Updated: 04/09/22 1957    AFB Culture & Smear [[id number]] Collected: 04/09/22 1857    Order  Status: Sent Specimen: Biopsy from Leg, Right Updated: 04/09/22 1957    Culture, Anaerobe [[id number]] Collected: 04/09/22 1857    Order Status: Sent Specimen: Biopsy from Leg, Right Updated: 04/09/22 1952              Imaging      No results found for this or any previous visit.      X-Ray Tibia Fibula 2 View Right  Narrative: EXAMINATION:  XR TIBIA FIBULA 2 VIEW RIGHT    CLINICAL HISTORY:  Right lower extremity cellulitis;    TECHNIQUE:  AP and lateral views of the right tibia and fibula were performed.    COMPARISON:  None.    FINDINGS:  No evidence of acute displaced fracture, dislocation, or osseous destructive process.  There is soft tissue swelling.  No radiopaque retained foreign body seen.  Impression: No acute osseous abnormality identified.    Electronically signed by: Milady Taylor MD  Date:    04/07/2022  Time:    21:58      All imaging within the last 24 hours was reviewed.       Discharge Planning   AFSHIN: 4/12/2022     Code Status: Full Code   Is the patient medically ready for discharge?: No    Reason for patient still in hospital (select all that apply): Patient trending condition, Laboratory test, Treatment, and Consult recommendations               Assessment/Plan:      * Pyoderma gangrenosum  - Interval history and physical exam findings as described above  - Does appear to have history of less severe ulcerative skin lesions  - Not on any immune-modifying therapy  - Treating for possible overlying cellulitis as below  - Dermatology consulted, appreciate assistance - punch biopsy done  - Wound care consulted  - PRN analgesics provided  - Continuing to monitor    Ileostomy status  Patient without any complaints regarding her ileostomy.  She is able to independently provide care.      Class 1 obesity due to excess calories without serious comorbidity in adult  - Body mass index is 32.66 kg/m².  - Pt counseled on dietary and lifestyle modifications in relation to health  - Consider dietary/nutrition  consult outpatient    Cellulitis of right leg  - Interval history and physical exam findings as described above  - Likely pyoderma gangrenosum, though given previous fever and soft BP, concern for concurrent cellulitis  - BCx pending  - Continue empiric vanc and change Zosyn to rocephin per ID recs    Ulcerative colitis  - S/p total colectomy in 2017  - No current medication regimen      VTE Risk Mitigation (From admission, onward)         Ordered     IP VTE HIGH RISK PATIENT  Once         04/08/22 0052     Place sequential compression device  Until discontinued         04/08/22 0052              High Risk Conditions:  Patient is currently on drug therapy requiring intensive monitoring for toxicity: Vancomycin      I have assessed these findings virtually using a telemed platform and with assistance of the bedside nurse.      The attending portion of this evaluation, treatment, and documentation was performed per Tracey Wright MD via Telemedicine AudioVisual using the secure [employer] software platform with 2 way audio/video. The provider was located off-site and the patient is located in the hospital. The aforementioned video software was utilized to document the relevant history and physical exam    Tracey Wright MD  Department of Hospital Medicine   [facility]

## 2022-04-11 NOTE — PROGRESS NOTES
[facility] Medicine  Telemedicine Progress Note    Patient Name: Lorelei Rodriguez  MRN: [id number]  Patient Class: IP- Inpatient   Admission Date: 4/7/2022  Length of Stay: 4 days  Attending Physician: Tracey Wright MD  Primary Care Provider: Batool Hinton MD          Subjective:     Principal Problem:Pyoderma gangrenosum        HPI:  Lorelei Rodriguez is a 26 y.o. female with ulcerative colitis who presented initially to [facility] ED with complaints of right leg wound for the past two weeks.  She reports that the wound initially started as a red, raised lesion for which she had a telemedicine visit with her dermatologist on Mar 22, 2022.  She was prescribed a course of doxycycline with which she was compliant.  Unfortunately, it seemed to worsen and she presented to the ED on Apr 2, 2022 and was prescribed a course of amoxicillin/clavulanate.  She also felt that there was no improvement and since that the lesion started to bleed a couple days ago.  She denies any fevers other than today which was 100.5° F. She denies any trauma to her right lower extremity.  She does volunteer that this lesion looks similar to two lesions on her right forearm that was due to pyoderma gangrenosum.  She has otherwise been in her usual state of health.        Overview/Hospital Course:  Pt transferred to [facility] and arrived on 4/9. Other than RLE wound, she remains HDS and grossly asymptomatic. Dermatology consulted for assistance for likely pyoderma gangrenosum; continuing coverage with abx for possible superimposed cellulitis.        This encounter was provided through telemedicine.  Patient was transferred to the telemedicine service on:  04/10/2022   The patient location is: [address] [place] [address] A admitted 4/7/2022  6:46 PM.  Present with the patient at the time of the telemed/virtual assessment: Telepresenter    Interval History/Overnight Events:     No complaints - IV infiltrated but able to be replaced; right arm (previous IV  site);ultrasound UE shows superficial thrombophlebitis and occlusion of her right cephalic vein; her leg wound has decreased erythema surrounding but remains with drainage    Patient's mother at bedside.    Review of Systems   Constitutional:  Negative for activity change, appetite change and fever.   Respiratory:  Negative for shortness of breath.    Cardiovascular:  Negative for chest pain.   Gastrointestinal:  Negative for diarrhea and vomiting.   Skin:  Positive for wound.      Inpatient Medications:  Scheduled Meds:   cefTRIAXone (ROCEPHIN) IVPB  2 g Intravenous Q24H    citalopram  40 mg Oral Daily    mupirocin   Nasal BID    potassium chloride  40 mEq Oral Q4H     Continuous Infusions:      PRN Meds:.calcium carbonate, diphenhydrAMINE, HYDROcodone-acetaminophen, melatonin, morphine, prochlorperazine, senna, simethicone, Pharmacy to dose Vancomycin consult **AND** vancomycin - pharmacy to dose      Objective:     Temp:  [96.8 °F (36 °C)-98.9 °F (37.2 °C)] 98.9 °F (37.2 °C)  Pulse:  [] 89  Resp:  [16-20] 20  SpO2:  [95 %-98 %] 95 %  BP: (105-118)/(62-72) 111/62    No intake or output data in the 24 hours ending 04/11/22 1025     Body mass index is 32.66 kg/m².    Physical Exam  Vitals and nursing note reviewed.   Constitutional:       General: She is not in acute distress.     Appearance: Normal appearance.   HENT:      Head: Normocephalic and atraumatic.   Eyes:      General: No scleral icterus.        Right eye: No discharge.         Left eye: No discharge.      Extraocular Movements: Extraocular movements intact.   Cardiovascular:      Rate and Rhythm: Normal rate.   Pulmonary:      Effort: Pulmonary effort is normal. No tachypnea or respiratory distress.   Skin:     Comments: Right leg wound with decreased erythema near previous markings ;she remains with significant bruising and redness centrally; her mother notes decreased edema from admit   Neurological:      General: No focal deficit present.       Mental Status: She is alert and oriented to person, place, and time.      Cranial Nerves: No cranial nerve deficit.      Motor: No weakness.   Psychiatric:         Attention and Perception: Attention normal.         Mood and Affect: Mood and affect normal.         Speech: Speech normal.         Behavior: Behavior is cooperative.         Thought Content: Thought content normal.        Labs:  Recent Results (from the past 24 hour(s))   Phosphorus    Collection Time: 04/11/22  3:45 AM   Result Value Ref Range    Phosphorus 3.2 2.7 - 4.5 mg/dL   Magnesium    Collection Time: 04/11/22  3:45 AM   Result Value Ref Range    Magnesium 1.6 1.6 - 2.6 mg/dL   Comprehensive Metabolic Panel    Collection Time: 04/11/22  3:45 AM   Result Value Ref Range    Sodium 143 136 - 145 mmol/L    Potassium 2.9 (L) 3.5 - 5.1 mmol/L    Chloride 106 95 - 110 mmol/L    CO2 26 23 - 29 mmol/L    Glucose 89 70 - 110 mg/dL    BUN 3 (L) 6 - 20 mg/dL    Creatinine 1.0 0.5 - 1.4 mg/dL    Calcium 9.1 8.7 - 10.5 mg/dL    Total Protein 6.4 6.0 - 8.4 g/dL    Albumin 2.4 (L) 3.5 - 5.2 g/dL    Total Bilirubin 0.5 0.1 - 1.0 mg/dL    Alkaline Phosphatase 91 55 - 135 U/L    AST 18 10 - 40 U/L    ALT 12 10 - 44 U/L    Anion Gap 11 8 - 16 mmol/L    eGFR if African American >60.0 >60 mL/min/1.73 m^2    eGFR if non African American >60.0 >60 mL/min/1.73 m^2   CBC Auto Differential    Collection Time: 04/11/22  3:45 AM   Result Value Ref Range    WBC 9.51 3.90 - 12.70 K/uL    RBC 4.31 4.00 - 5.40 M/uL    Hemoglobin 11.7 (L) 12.0 - 16.0 g/dL    Hematocrit 36.1 (L) 37.0 - 48.5 %    MCV 84 82 - 98 fL    MCH 27.1 27.0 - 31.0 pg    MCHC 32.4 32.0 - 36.0 g/dL    RDW 12.3 11.5 - 14.5 %    Platelets 303 150 - 450 K/uL    MPV 9.4 9.2 - 12.9 fL    Immature Granulocytes 0.2 0.0 - 0.5 %    Gran # (ANC) 6.7 1.8 - 7.7 K/uL    Immature Grans (Abs) 0.02 0.00 - 0.04 K/uL    Lymph # 1.4 1.0 - 4.8 K/uL    Mono # 0.9 0.3 - 1.0 K/uL    Eos # 0.4 0.0 - 0.5 K/uL    Baso # 0.05 0.00  - 0.20 K/uL    nRBC 0 0 /100 WBC    Gran % 70.2 38.0 - 73.0 %    Lymph % 14.8 (L) 18.0 - 48.0 %    Mono % 9.8 4.0 - 15.0 %    Eosinophil % 4.5 0.0 - 8.0 %    Basophil % 0.5 0.0 - 1.9 %    Differential Method Automated    VANCOMYCIN, TROUGH    Collection Time: 04/11/22  3:45 AM   Result Value Ref Range    Vancomycin-Trough 22.8 (H) 10.0 - 22.0 ug/mL        Lab Results   Component Value Date    [id number] Negative 04/07/2022       Recent Labs   Lab 04/09/22 0338 04/10/22  0423 04/11/22  0345   WBC 11.07 9.67 9.51   LYMPH 20.1  2.2 9.6*  0.9* 14.8*  1.4   HGB 12.5 12.7 11.7*   HCT 37.4 39.6 36.1*    316 303       Recent Labs   Lab 04/09/22  0338 04/10/22  0423 04/11/22  0345    143 143   K 2.9* 3.9 2.9*    107 106   CO2 23 25 26   BUN 3* 3* 3*   CREATININE 0.7 0.9 1.0   GLU 90 89 89   CALCIUM 8.6* 8.8 9.1   MG  --  1.7 1.6   PHOS  --  2.6* 3.2       Recent Labs   Lab 04/09/22  0338 04/10/22  0423 04/11/22  0345   ALKPHOS 83 87 91   ALT 14 14 12   AST 16 18 18   ALBUMIN 2.5* 2.7* 2.4*   PROT 6.2 6.1 6.4   BILITOT 0.9 0.6 0.5          Recent Labs     04/09/22 0338   CRP 84.6*         All labs within the last 24 hours were reviewed.     Microbiology:  Microbiology Results (last 7 days)       Procedure Component Value Units Date/Time    Aerobic culture [[id number]] Collected: 04/09/22 1857    Order Status: Completed Specimen: Biopsy from Leg, Right Updated: 04/11/22 0744     Aerobic Bacterial Culture No growth    Culture, Anaerobe [[id number]] Collected: 04/09/22 1857    Order Status: Completed Specimen: Biopsy from Leg, Right Updated: 04/11/22 0738     Anaerobic Culture Culture in progress    Blood culture [[id number]] Collected: 04/07/22 2014    Order Status: Completed Specimen: Blood from Peripheral, Antecubital, Left Updated: 04/11/22 0303     Blood Culture, Routine No Growth to date      No Growth to date      No Growth to date      No Growth to date    Blood culture [[id number]] Collected:  04/07/22 1954    Order Status: Completed Specimen: Blood from Peripheral, Antecubital, Right Updated: 04/11/22 0303     Blood Culture, Routine No Growth to date      No Growth to date      No Growth to date      No Growth to date    AFB Culture & Smear [[id number]] Collected: 04/09/22 1857    Order Status: Completed Specimen: Biopsy from Leg, Right Updated: 04/10/22 2127     AFB Culture & Smear Culture in progress    Fungus culture [[id number]] Collected: 04/09/22 1857    Order Status: Sent Specimen: Biopsy from Leg, Right Updated: 04/09/22 1957              Imaging      No results found for this or any previous visit.      X-Ray Tibia Fibula 2 View Right  Narrative: EXAMINATION:  XR TIBIA FIBULA 2 VIEW RIGHT    CLINICAL HISTORY:  Right lower extremity cellulitis;    TECHNIQUE:  AP and lateral views of the right tibia and fibula were performed.    COMPARISON:  None.    FINDINGS:  No evidence of acute displaced fracture, dislocation, or osseous destructive process.  There is soft tissue swelling.  No radiopaque retained foreign body seen.  Impression: No acute osseous abnormality identified.    Electronically signed by: Milady Taylor MD  Date:    04/07/2022  Time:    21:58      All imaging within the last 24 hours was reviewed.       Discharge Planning   AFSHIN: 4/13/2022     Code Status: Full Code   Is the patient medically ready for discharge?: No    Reason for patient still in hospital (select all that apply): Patient trending condition, Laboratory test, Treatment, and Consult recommendations               Assessment/Plan:      * Pyoderma gangrenosum  - Interval history and physical exam findings as described above  - Does appear to have history of less severe ulcerative skin lesions  - Not on any immune-modifying therapy  - Treating for possible overlying cellulitis as below  - Dermatology consulted, appreciate assistance - punch biopsy done  - Wound care consulted  - PRN analgesics provided  - Continuing to  monitor    Ileostomy status  Patient without any complaints regarding her ileostomy.  She is able to independently provide care.      Class 1 obesity due to excess calories without serious comorbidity in adult  - Body mass index is 32.66 kg/m².  - Pt counseled on dietary and lifestyle modifications in relation to health  - Consider dietary/nutrition consult outpatient    Cellulitis of right leg  - Interval history and physical exam findings as described above  - Likely pyoderma gangrenosum, though given previous fever and soft BP, concern for concurrent cellulitis  - BCx negative  - wound culture with no growth so far  - Continue empiric vanc and change Zosyn to rocephin per ID recs end date of 4/13  - change IV on 4/11 due to infiltration with superficial thrombophlebitis found    Ulcerative colitis  - S/p total colectomy in 2017  - No current medication regimen      VTE Risk Mitigation (From admission, onward)         Ordered     enoxaparin injection 40 mg  Daily         04/11/22 1751     IP VTE HIGH RISK PATIENT  Once         04/08/22 0052     Place sequential compression device  Until discontinued         04/08/22 0052              High Risk Conditions:  Patient is currently on drug therapy requiring intensive monitoring for toxicity: Vancomycin    I have assessed these findings virtually using a telemed platform and with assistance of the bedside nurse.        The attending portion of this evaluation, treatment, and documentation was performed per Tracey Wright MD via Telemedicine AudioVisual using the secure [employer] software platform with 2 way audio/video. The provider was located off-site and the patient is located in the hospital. The aforementioned video software was utilized to document the relevant history and physical exam    Tracey Wright MD  Department of Hospital Medicine   [facility]

## 2022-04-11 NOTE — ASSESSMENT & PLAN NOTE
- Interval history and physical exam findings as described above  - Likely pyoderma gangrenosum, though given previous fever and soft BP, concern for concurrent cellulitis  - BCx pending  - Continue empiric vanc and change Zosyn to rocephin per ID recs

## 2022-04-11 NOTE — PLAN OF CARE
[facility]  Initial Discharge Assessment       Primary Care Provider: Batool Hinton MD    Admission Diagnosis: Cellulitis of right leg [L03.115]  Therapy failure due to antibiotic resistance [Z16.20]  Other ulcerative colitis with other complication [K51.818]  Sepsis, due to unspecified organism, unspecified whether acute organ dysfunction present [A41.9]    Admission Date: 4/7/2022  Expected Discharge Date: 4/13/2022    Discharge Barriers Identified: None    Payor: HUMANA / Plan: HUMANA  PPO / Product Type: PPO /     Extended Emergency Contact Information  Primary Emergency Contact: Gilberto Rodriguez   [place]  Mobile Phone: [phone]  Relation: Father    Discharge Plan A: Home with family  Discharge Plan B: Home Health      [address] [facility]  [address] [facility] [address]  Phone: [phone] Fax: [phone]      Initial Assessment (most recent)     Adult Discharge Assessment - 04/11/22 1303        Discharge Assessment    Assessment Type Discharge Planning Assessment     Confirmed/corrected address, phone number and insurance Yes     Confirmed Demographics Correct on Facesheet     Source of Information patient;family     When was your last doctors appointment? 02/24/22     Does patient/caregiver understand observation status Yes     Communicated AFSHIN with patient/caregiver Yes     Reason For Admission pain, redness to low leg     Lives With parent(s)     Facility Arrived From: Home     Do you expect to return to your current living situation? Yes     Do you have help at home or someone to help you manage your care at home? Yes     Who are your caregiver(s) and their phone number(s)? Mother Keyla, father Gilberto     Prior to hospitilization cognitive status: Alert/Oriented;No Deficits     Current cognitive status: Alert/Oriented;No Deficits     Walking or Climbing Stairs Difficulty none     Dressing/Bathing Difficulty none     Equipment  Currently Used at Home none     Readmission within 30 days? No     Patient currently being followed by outpatient case management? No     Do you currently have service(s) that help you manage your care at home? No     Do you take prescription medications? Yes     Do you have prescription coverage? Yes     Do you have any problems affording any of your prescribed medications? No     Is the patient taking medications as prescribed? yes     Who is going to help you get home at discharge? Parents     How do you get to doctors appointments? car, drives self;family or friend will provide     Are you on dialysis? No     Do you take coumadin? No     Discharge Plan A Home with family     Discharge Plan B Home Health     DME Needed Upon Discharge  none     Discharge Plan discussed with: Parent(s);Patient     Name(s) and Number(s) Keyla      Discharge Barriers Identified None        Relationship/Environment    Name(s) of Who Lives With Patient Keyla, mother, Gilberto father

## 2022-04-12 LAB
ALBUMIN SERPL BCP-MCNC: 2.6 G/DL (ref 3.5–5.2)
ALP SERPL-CCNC: 92 U/L (ref 55–135)
ALT SERPL W/O P-5'-P-CCNC: 20 U/L (ref 10–44)
ANION GAP SERPL CALC-SCNC: 8 MMOL/L (ref 8–16)
AST SERPL-CCNC: 28 U/L (ref 10–40)
BACTERIA BLD CULT: NORMAL
BACTERIA BLD CULT: NORMAL
BASOPHILS # BLD AUTO: 0.05 K/UL (ref 0–0.2)
BASOPHILS NFR BLD: 0.5 % (ref 0–1.9)
BILIRUB SERPL-MCNC: 0.8 MG/DL (ref 0.1–1)
BUN SERPL-MCNC: 5 MG/DL (ref 6–20)
CALCIUM SERPL-MCNC: 9.9 MG/DL (ref 8.7–10.5)
CHLORIDE SERPL-SCNC: 103 MMOL/L (ref 95–110)
CO2 SERPL-SCNC: 29 MMOL/L (ref 23–29)
CREAT SERPL-MCNC: 1.8 MG/DL (ref 0.5–1.4)
DIFFERENTIAL METHOD: ABNORMAL
EOSINOPHIL # BLD AUTO: 0.5 K/UL (ref 0–0.5)
EOSINOPHIL NFR BLD: 4.2 % (ref 0–8)
ERYTHROCYTE [DISTWIDTH] IN BLOOD BY AUTOMATED COUNT: 12.3 % (ref 11.5–14.5)
EST. GFR  (AFRICAN AMERICAN): 44.1 ML/MIN/1.73 M^2
EST. GFR  (NON AFRICAN AMERICAN): 38.3 ML/MIN/1.73 M^2
GAMMA INTERFERON BACKGROUND BLD IA-ACNC: 0.08 IU/ML
GLUCOSE SERPL-MCNC: 84 MG/DL (ref 70–110)
HCT VFR BLD AUTO: 37 % (ref 37–48.5)
HGB BLD-MCNC: 12.1 G/DL (ref 12–16)
IMM GRANULOCYTES # BLD AUTO: 0.03 K/UL (ref 0–0.04)
IMM GRANULOCYTES NFR BLD AUTO: 0.3 % (ref 0–0.5)
LYMPHOCYTES # BLD AUTO: 1.4 K/UL (ref 1–4.8)
LYMPHOCYTES NFR BLD: 12.8 % (ref 18–48)
M TB IFN-G CD4+ BCKGRND COR BLD-ACNC: 0 IU/ML
MAGNESIUM SERPL-MCNC: 1.8 MG/DL (ref 1.6–2.6)
MCH RBC QN AUTO: 27.3 PG (ref 27–31)
MCHC RBC AUTO-ENTMCNC: 32.7 G/DL (ref 32–36)
MCV RBC AUTO: 83 FL (ref 82–98)
MITOGEN IGNF BCKGRD COR BLD-ACNC: 6 IU/ML
MONOCYTES # BLD AUTO: 1.1 K/UL (ref 0.3–1)
MONOCYTES NFR BLD: 10.7 % (ref 4–15)
NEUTROPHILS # BLD AUTO: 7.6 K/UL (ref 1.8–7.7)
NEUTROPHILS NFR BLD: 71.5 % (ref 38–73)
NRBC BLD-RTO: 0 /100 WBC
PHOSPHATE SERPL-MCNC: 4.1 MG/DL (ref 2.7–4.5)
PLATELET # BLD AUTO: 365 K/UL (ref 150–450)
PMV BLD AUTO: 9.4 FL (ref 9.2–12.9)
POTASSIUM SERPL-SCNC: 3.8 MMOL/L (ref 3.5–5.1)
PROT SERPL-MCNC: 7.3 G/DL (ref 6–8.4)
RBC # BLD AUTO: 4.44 M/UL (ref 4–5.4)
SODIUM SERPL-SCNC: 140 MMOL/L (ref 136–145)
TB GOLD PLUS: NEGATIVE
TB2 - NIL: 0 IU/ML
VANCOMYCIN SERPL-MCNC: 21.6 UG/ML
WBC # BLD AUTO: 10.68 K/UL (ref 3.9–12.7)

## 2022-04-12 PROCEDURE — 83735 ASSAY OF MAGNESIUM: CPT | Performed by: STUDENT IN AN ORGANIZED HEALTH CARE EDUCATION/TRAINING PROGRAM

## 2022-04-12 PROCEDURE — 25000003 PHARM REV CODE 250: Performed by: INTERNAL MEDICINE

## 2022-04-12 PROCEDURE — 85025 COMPLETE CBC W/AUTO DIFF WBC: CPT | Performed by: STUDENT IN AN ORGANIZED HEALTH CARE EDUCATION/TRAINING PROGRAM

## 2022-04-12 PROCEDURE — 84100 ASSAY OF PHOSPHORUS: CPT | Performed by: STUDENT IN AN ORGANIZED HEALTH CARE EDUCATION/TRAINING PROGRAM

## 2022-04-12 PROCEDURE — 80053 COMPREHEN METABOLIC PANEL: CPT | Performed by: STUDENT IN AN ORGANIZED HEALTH CARE EDUCATION/TRAINING PROGRAM

## 2022-04-12 PROCEDURE — 25000003 PHARM REV CODE 250: Performed by: PHYSICIAN ASSISTANT

## 2022-04-12 PROCEDURE — 99232 PR SUBSEQUENT HOSPITAL CARE,LEVL II: ICD-10-PCS | Mod: 95,,, | Performed by: INTERNAL MEDICINE

## 2022-04-12 PROCEDURE — 99232 SBSQ HOSP IP/OBS MODERATE 35: CPT | Mod: 95,,, | Performed by: INTERNAL MEDICINE

## 2022-04-12 PROCEDURE — 63600175 PHARM REV CODE 636 W HCPCS: Performed by: INTERNAL MEDICINE

## 2022-04-12 PROCEDURE — 97161 PT EVAL LOW COMPLEX 20 MIN: CPT

## 2022-04-12 PROCEDURE — 80202 ASSAY OF VANCOMYCIN: CPT | Performed by: INTERNAL MEDICINE

## 2022-04-12 PROCEDURE — 11000001 HC ACUTE MED/SURG PRIVATE ROOM

## 2022-04-12 PROCEDURE — 97116 GAIT TRAINING THERAPY: CPT

## 2022-04-12 RX ORDER — HEPARIN SODIUM 5000 [USP'U]/ML
5000 INJECTION, SOLUTION INTRAVENOUS; SUBCUTANEOUS EVERY 12 HOURS
Status: CANCELLED | OUTPATIENT
Start: 2022-04-12

## 2022-04-12 RX ORDER — SODIUM CHLORIDE 9 MG/ML
INJECTION, SOLUTION INTRAVENOUS CONTINUOUS
Status: ACTIVE | OUTPATIENT
Start: 2022-04-12 | End: 2022-04-13

## 2022-04-12 RX ORDER — HYDROXYZINE HYDROCHLORIDE 25 MG/1
25 TABLET, FILM COATED ORAL ONCE
Status: COMPLETED | OUTPATIENT
Start: 2022-04-12 | End: 2022-04-12

## 2022-04-12 RX ADMIN — PROCHLORPERAZINE EDISYLATE 5 MG: 5 INJECTION INTRAMUSCULAR; INTRAVENOUS at 06:04

## 2022-04-12 RX ADMIN — HYDROCODONE BITARTRATE AND ACETAMINOPHEN 1 TABLET: 5; 325 TABLET ORAL at 07:04

## 2022-04-12 RX ADMIN — SODIUM CHLORIDE: 0.9 INJECTION, SOLUTION INTRAVENOUS at 06:04

## 2022-04-12 RX ADMIN — SODIUM CHLORIDE: 0.9 INJECTION, SOLUTION INTRAVENOUS at 10:04

## 2022-04-12 RX ADMIN — CEFTRIAXONE 2 G: 2 INJECTION, SOLUTION INTRAVENOUS at 02:04

## 2022-04-12 RX ADMIN — HYDROCODONE BITARTRATE AND ACETAMINOPHEN 1 TABLET: 5; 325 TABLET ORAL at 02:04

## 2022-04-12 RX ADMIN — MORPHINE SULFATE 4 MG: 4 INJECTION INTRAVENOUS at 06:04

## 2022-04-12 RX ADMIN — CITALOPRAM HYDROBROMIDE 40 MG: 20 TABLET ORAL at 09:04

## 2022-04-12 RX ADMIN — HYDROXYZINE HYDROCHLORIDE 25 MG: 25 TABLET ORAL at 08:04

## 2022-04-12 NOTE — PROGRESS NOTES
Pharmacokinetic Assessment Follow Up: IV Vancomycin    Therapy with vancomycin will complete 4/13 and due to ROSALINO patient will not require a dose before then.  Pharmacy will sign off, please re-consult as needed.    Sandor Bautista, PharmD, BCPS

## 2022-04-12 NOTE — CONSULTS
Wound care consult received. Dermatology was also consulted for the patient and has seen her. They have diagnosed the wound as PG and are managing accordingly. No need for wound care nurse at this time. Nursing to continue care. Wound care to sign off.

## 2022-04-12 NOTE — PLAN OF CARE
Adams completed. No acute physical therapy services needed at this time.    Problem: Physical Therapy  Goal: Physical Therapy Goal  Description: Pt does not require acute PT services at this time due to demonstrating safety with functional mobility with AD, including transfers and ambulation, thus no goals created.        Outcome: Met    4/12/2022   Lilia Prater, PT

## 2022-04-12 NOTE — SUBJECTIVE & OBJECTIVE
This encounter was provided through telemedicine.  Patient was transferred to the telemedicine service on:  04/10/2022   The patient location is: 4/624 A admitted 4/7/2022  6:46 PM.  Present with the patient at the time of the telemed/virtual assessment: Telepresenter    Interval History/Overnight Events:     Nauseous with emesis X 1 today - Cr increased after elevated Vanc - no change in UOP; IVF resumed - pathology returrned with PG and derm recommends prednisone therapy    Family member at bedside.    Review of Systems   Constitutional:  Negative for activity change, appetite change and fever.   Respiratory:  Negative for shortness of breath.    Cardiovascular:  Negative for chest pain.   Gastrointestinal:  Negative for diarrhea and vomiting.   Skin:  Positive for wound.      Inpatient Medications:  Scheduled Meds:   cefTRIAXone (ROCEPHIN) IVPB  2 g Intravenous Q24H    citalopram  40 mg Oral Daily    mupirocin   Nasal BID     Continuous Infusions:   sodium chloride 0.9% 100 mL/hr at 04/12/22 1003       PRN Meds:.calcium carbonate, diphenhydrAMINE, HYDROcodone-acetaminophen, melatonin, morphine, prochlorperazine, senna, simethicone      Objective:     Temp:  [96.8 °F (36 °C)-99.3 °F (37.4 °C)] 98.5 °F (36.9 °C)  Pulse:  [] 86  Resp:  [16-20] 18  SpO2:  [90 %-96 %] 94 %  BP: (113-132)/(70-79) 113/72      Intake/Output Summary (Last 24 hours) at 4/12/2022 1144  Last data filed at 4/11/2022 2230  Gross per 24 hour   Intake --   Output 500 ml   Net -500 ml          Body mass index is 32.66 kg/m².    Physical Exam  Vitals and nursing note reviewed.   Constitutional:       General: She is not in acute distress.     Appearance: Normal appearance.   HENT:      Head: Normocephalic and atraumatic.   Eyes:      General: No scleral icterus.        Right eye: No discharge.         Left eye: No discharge.      Extraocular Movements: Extraocular movements intact.   Cardiovascular:      Rate and Rhythm: Normal rate.    Pulmonary:      Effort: Pulmonary effort is normal. No tachypnea or respiratory distress.   Skin:     Comments: Right leg wound with decreased erythema near previous markings ;she remains with significant bruising and redness centrally; her mother notes decreased edema from admit   Neurological:      General: No focal deficit present.      Mental Status: She is alert and oriented to person, place, and time.      Cranial Nerves: No cranial nerve deficit.      Motor: No weakness.   Psychiatric:         Attention and Perception: Attention normal.         Mood and Affect: Mood and affect normal.         Speech: Speech normal.         Behavior: Behavior is cooperative.         Thought Content: Thought content normal.        Labs:  Recent Results (from the past 24 hour(s))   Phosphorus    Collection Time: 04/12/22  5:21 AM   Result Value Ref Range    Phosphorus 4.1 2.7 - 4.5 mg/dL   Magnesium    Collection Time: 04/12/22  5:21 AM   Result Value Ref Range    Magnesium 1.8 1.6 - 2.6 mg/dL   Comprehensive Metabolic Panel    Collection Time: 04/12/22  5:21 AM   Result Value Ref Range    Sodium 140 136 - 145 mmol/L    Potassium 3.8 3.5 - 5.1 mmol/L    Chloride 103 95 - 110 mmol/L    CO2 29 23 - 29 mmol/L    Glucose 84 70 - 110 mg/dL    BUN 5 (L) 6 - 20 mg/dL    Creatinine 1.8 (H) 0.5 - 1.4 mg/dL    Calcium 9.9 8.7 - 10.5 mg/dL    Total Protein 7.3 6.0 - 8.4 g/dL    Albumin 2.6 (L) 3.5 - 5.2 g/dL    Total Bilirubin 0.8 0.1 - 1.0 mg/dL    Alkaline Phosphatase 92 55 - 135 U/L    AST 28 10 - 40 U/L    ALT 20 10 - 44 U/L    Anion Gap 8 8 - 16 mmol/L    eGFR if African American 44.1 (A) >60 mL/min/1.73 m^2    eGFR if non  38.3 (A) >60 mL/min/1.73 m^2   CBC Auto Differential    Collection Time: 04/12/22  5:21 AM   Result Value Ref Range    WBC 10.68 3.90 - 12.70 K/uL    RBC 4.44 4.00 - 5.40 M/uL    Hemoglobin 12.1 12.0 - 16.0 g/dL    Hematocrit 37.0 37.0 - 48.5 %    MCV 83 82 - 98 fL    MCH 27.3 27.0 - 31.0 pg     MCHC 32.7 32.0 - 36.0 g/dL    RDW 12.3 11.5 - 14.5 %    Platelets 365 150 - 450 K/uL    MPV 9.4 9.2 - 12.9 fL    Immature Granulocytes 0.3 0.0 - 0.5 %    Gran # (ANC) 7.6 1.8 - 7.7 K/uL    Immature Grans (Abs) 0.03 0.00 - 0.04 K/uL    Lymph # 1.4 1.0 - 4.8 K/uL    Mono # 1.1 (H) 0.3 - 1.0 K/uL    Eos # 0.5 0.0 - 0.5 K/uL    Baso # 0.05 0.00 - 0.20 K/uL    nRBC 0 0 /100 WBC    Gran % 71.5 38.0 - 73.0 %    Lymph % 12.8 (L) 18.0 - 48.0 %    Mono % 10.7 4.0 - 15.0 %    Eosinophil % 4.2 0.0 - 8.0 %    Basophil % 0.5 0.0 - 1.9 %    Differential Method Automated    Vancomycin, random    Collection Time: 04/12/22  5:21 AM   Result Value Ref Range    Vancomycin, Random 21.6 Not established ug/mL        Lab Results   Component Value Date    BUD25FPTLRED Negative 04/07/2022       Recent Labs   Lab 04/10/22  0423 04/11/22  0345 04/12/22  0521   WBC 9.67 9.51 10.68   LYMPH 9.6*  0.9* 14.8*  1.4 12.8*  1.4   HGB 12.7 11.7* 12.1   HCT 39.6 36.1* 37.0    303 365       Recent Labs   Lab 04/10/22  0423 04/11/22  0345 04/12/22  0521    143 140   K 3.9 2.9* 3.8    106 103   CO2 25 26 29   BUN 3* 3* 5*   CREATININE 0.9 1.0 1.8*   GLU 89 89 84   CALCIUM 8.8 9.1 9.9   MG 1.7 1.6 1.8   PHOS 2.6* 3.2 4.1       Recent Labs   Lab 04/10/22  0423 04/11/22  0345 04/12/22  0521   ALKPHOS 87 91 92   ALT 14 12 20   AST 18 18 28   ALBUMIN 2.7* 2.4* 2.6*   PROT 6.1 6.4 7.3   BILITOT 0.6 0.5 0.8          No results for input(s): DDIMER, FERRITIN, CRP, LDH, BNP, TROPONINI, CPK in the last 72 hours.    Invalid input(s): PROCALCITONIN      All labs within the last 24 hours were reviewed.     Microbiology:  Microbiology Results (last 7 days)       Procedure Component Value Units Date/Time    Culture, Anaerobe [[id number]] Collected: 04/09/22 1857    Order Status: Completed Specimen: Biopsy from Leg, Right Updated: 04/12/22 1103     Anaerobic Culture Culture in progress    Fungus culture [[id number]] Collected: 04/09/22 1857    Order  Status: Completed Specimen: Biopsy from Leg, Right Updated: 04/12/22 0920     Fungus (Mycology) Culture Culture in progress    Blood culture [[id number]] Collected: 04/07/22 1954    Order Status: Completed Specimen: Blood from Peripheral, Antecubital, Right Updated: 04/12/22 0303     Blood Culture, Routine No Growth after 4 days.     Blood culture [[id number]] Collected: 04/07/22 2014    Order Status: Completed Specimen: Blood from Peripheral, Antecubital, Left Updated: 04/12/22 0303     Blood Culture, Routine No Growth after 4 days.     AFB Culture & Smear [[id number]] Collected: 04/09/22 1857    Order Status: Completed Specimen: Biopsy from Leg, Right Updated: 04/11/22 1359     AFB Culture & Smear Culture in progress     AFB CULTURE STAIN No acid fast bacilli seen.    Aerobic culture [[id number]] Collected: 04/09/22 1857    Order Status: Completed Specimen: Biopsy from Leg, Right Updated: 04/11/22 0744     Aerobic Bacterial Culture No growth              Imaging      No results found for this or any previous visit.      US Upper Extremity Veins Right  Narrative: EXAMINATION:  US UPPER EXTREMITY VEINS RIGHT    CLINICAL HISTORY:  UE with edema after IV infiltration;    TECHNIQUE:  Duplex and color flow Doppler evaluation and dynamic compression was performed of the right upper extremity veins.    COMPARISON:  None    FINDINGS:  Central veins: The internal jugular, subclavian, and axillary veins are patent and free of thrombus.    Arm veins: The brachial and basilic veins are patent and compressible.  Noncompressible cephalic vein with occlusive thrombus.    Contralateral subclavian/internal jugular veins: The left subclavian and internal jugular veins are patent and free of thrombus.  Impression: Superficial thrombophlebitis with occlusion of the right cephalic vein.    No evidence of deep vein thrombus.    This report was flagged in Epic as abnormal.    Electronically signed by resident: Aneta  Leopoldo  Date:    04/11/2022  Time:    14:04    Electronically signed by: Raymundo Silveira MD  Date:    04/11/2022  Time:    14:11      All imaging within the last 24 hours was reviewed.       Discharge Planning   AFSHIN: 4/13/2022     Code Status: Full Code   Is the patient medically ready for discharge?: No    Reason for patient still in hospital (select all that apply): Patient trending condition, Laboratory test, Treatment, and Consult recommendations  Discharge Plan A: Home with family   Discharge Delays: None known at this time

## 2022-04-12 NOTE — PROGRESS NOTES
Pharmacokinetic Assessment Follow Up: IV Vancomycin    Vancomycin serum concentration assessment(s):    The random level was drawn correctly and can be used to guide therapy at this time. The measurement is above the desired definitive target range of 10 to 20 mcg/mL.    Vancomycin Regimen Plan:    Patients ROSALINO persists and her serum creatinine has increased to 1.8  Per progress note, EOT 4/13  Patient will not require another dose before this time  Pharmacy will sign off, please re-consult us if vancomycin therapy needs to be prolonged or restarted for another indication    Drug levels (last 3 results):  Recent Labs   Lab Result Units 04/09/22  1409 04/11/22  0345 04/12/22  0521   Vancomycin, Random ug/mL  --   --  21.6   Vancomycin-Trough ug/mL 10.6 22.8*  --        Pharmacy will continue to follow and monitor vancomycin.    Please contact pharmacy at extension [phone] for questions regarding this assessment.    Thank you for the consult,   Sandor Bautista, PharmD, [facility]      Patient brief summary:  Lorelei Rodriguez is a 26 y.o. female initiated on antimicrobial therapy with IV Vancomycin for treatment of skin & soft tissue infection    Drug Allergies:   Review of patient's allergies indicates:   Allergen Reactions    Nickel sutures [surgical stainless steel]     Nickel Rash       Actual Body Weight:   78.4 kg    Renal Function:   Estimated Creatinine Clearance: 44.9 mL/min (A) (based on SCr of 1.8 mg/dL (H)).,     Dialysis Method (if applicable):  N/A    CBC (last 72 hours):  Recent Labs   Lab Result Units 04/10/22  0423 04/11/22  0345 04/12/22  0521   WBC K/uL 9.67 9.51 10.68   Hemoglobin g/dL 12.7 11.7* 12.1   Hematocrit % 39.6 36.1* 37.0   Platelets K/uL 316 303 365   Gran % % 77.5* 70.2 71.5   Lymph % % 9.6* 14.8* 12.8*   Mono % % 8.8 9.8 10.7   Eosinophil % % 3.3 4.5 4.2   Basophil % % 0.5 0.5 0.5   Differential Method  Automated Automated Automated       Metabolic Panel (last 72 hours):  Recent Labs   Lab Result  Units 04/10/22  0423 04/11/22  0345 04/12/22  0521   Sodium mmol/L 143 143 140   Potassium mmol/L 3.9 2.9* 3.8   Chloride mmol/L 107 106 103   CO2 mmol/L 25 26 29   Glucose mg/dL 89 89 84   BUN mg/dL 3* 3* 5*   Creatinine mg/dL 0.9 1.0 1.8*   Albumin g/dL 2.7* 2.4* 2.6*   Total Bilirubin mg/dL 0.6 0.5 0.8   Alkaline Phosphatase U/L 87 91 92   AST U/L 18 18 28   ALT U/L 14 12 20   Magnesium mg/dL 1.7 1.6 1.8   Phosphorus mg/dL 2.6* 3.2 4.1       Vancomycin Administrations:  vancomycin given in the last 96 hours                   vancomycin 1.5 g in dextrose 5 % 250 mL IVPB (ready to mix) (mg) 1,500 mg New Bag 04/11/22 0405     1,500 mg New Bag 04/10/22 1606     1,500 mg New Bag  0414     1,500 mg New Bag 04/09/22 1724    vancomycin 1.25 g in dextrose 5% 250 mL IVPB (ready to mix) (mg) 1,250 mg New Bag 04/09/22 0124     1,250 mg New Bag 04/08/22 0915                Microbiologic Results:  Microbiology Results (last 7 days)     Procedure Component Value Units Date/Time    Blood culture [[id number]] Collected: 04/07/22 1954    Order Status: Completed Specimen: Blood from Peripheral, Antecubital, Right Updated: 04/12/22 0303     Blood Culture, Routine No Growth after 4 days.     Blood culture [[id number]] Collected: 04/07/22 2014    Order Status: Completed Specimen: Blood from Peripheral, Antecubital, Left Updated: 04/12/22 0303     Blood Culture, Routine No Growth after 4 days.     AFB Culture & Smear [[id number]] Collected: 04/09/22 1857    Order Status: Completed Specimen: Biopsy from Leg, Right Updated: 04/11/22 1359     AFB Culture & Smear Culture in progress     AFB CULTURE STAIN No acid fast bacilli seen.    Aerobic culture [[id number]] Collected: 04/09/22 1857    Order Status: Completed Specimen: Biopsy from Leg, Right Updated: 04/11/22 0744     Aerobic Bacterial Culture No growth    Culture, Anaerobe [[id number]] Collected: 04/09/22 0737    Order Status: Completed Specimen: Biopsy from Leg, Right Updated: 04/11/22  0738     Anaerobic Culture Culture in progress    Fungus culture [[id number]] Collected: 04/09/22 1857    Order Status: Sent Specimen: Biopsy from Leg, Right Updated: 04/09/22 1957

## 2022-04-12 NOTE — PLAN OF CARE
[facility]  Discharge Reassessment    Primary Care Provider: Batool Hinton MD    Expected Discharge Date: 4/13/2022    Reassessment (most recent)     Discharge Reassessment - 04/12/22 0927        Discharge Reassessment    Assessment Type Discharge Planning Reassessment     Did the patient's condition or plan change since previous assessment? No     Discharge Plan discussed with: Patient;Parent(s)     Communicated AFSHIN with patient/caregiver Yes     Discharge Plan A Home with family     Discharge Plan B Home Health     DME Needed Upon Discharge  none     Discharge Barriers Identified None     Why the patient remains in the hospital Requires continued medical care        Post-Acute Status    Hospital Resources/Appts/Education Provided Appointments scheduled and added to AVS     Discharge Delays None known at this time

## 2022-04-12 NOTE — PLAN OF CARE
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     Plan:  Pyoderma gangrenosum   -start prednisone 60mg daily until she follows up with us in Dermatology clinic in 2 weeks  -will schedule her an appointment with Derm in 2 weeks  -patient scheduled for 4/26/22 at 1pm for Dermatology follow up  -recommend scheduling patient to see GI soon after discharge to discuss starting Humira given her hx of UC and in the setting of PG as PG responds well to Humira  -antibiotics per primary team and ID      Signed:  Lilia Soto MD  Dermatology, PGY-II

## 2022-04-12 NOTE — PT/OT/SLP EVAL
"Physical Therapy Evaluation and Discharge Note     Patient Name:  Lorelei Rodriguez  MRN: [id number]    Admit Date: 4/7/2022  Admitting Diagnosis:  Pyoderma gangrenosum  Length of Stay: 5 days  Recent Surgery: * No surgery found *      Recommendations:     Discharge Recommendations: Home, no PT needs anticipated  Equipment recommendations: rolling walker  Barriers to discharge: None Identified     Assessment:     Lorelei Rodriguez is a 26 y.o. female admitted to [facility] on 4/7/2022 with medical diagnosis of Pyoderma gangrenosum. Pt presents with impaired endurance, impaired functional mobilty. These deficits effect their roles and responsibilities in which they were able to complete prior to admit. Pt demonstrated safe transfers and ambulation with RW (SBA-supervision). Reported that she felt more comfortable ambulating with AD. Lorelei Rodriguez does not require acute physical therapy services at this time, as she is near functional mobility baseline. Encouraged pt to ambulate with family and nsg throughout day during current admission. Once medically stable, recommending pt discharge to Home, no PT needs anticipated.      Rehab Prognosis: Good    Plan:     During this hospitalization, patient does not require acute physical therapy needs as she demonstrated safety with ambulation with RW.    Plan of Care reviewed with: patient, family    This plan of care has been discussed with the patient/caregiver, who was included in its development and is in agreement with the identified goals and treatment plan.     Subjective     Communicated with RN prior to session.  Patient found sitting edge of bed upon PT entry to room, agreeable to evaluation. Pt's family present during session.    Chief Complaint: R anterior LE pain      Patient/Family Comments/goals: "I like how walking feels with this" in reference to RW    Pain/Comfort:  · Pain Rating 1: 3/10  · Location - Side 1: Right  · Location - Orientation 1: anterior  · Location 1: leg  · Pain " Addressed 1: Reposition, Distraction  · Pain Rating Post-Intervention 1: 3/10    Patients cultural, spiritual, Sabianism conflicts given the current situation: None identified     Patient History: information obtained from pt    Living Environment: Pt lives with mom and dad in single level home  with 0 ERIK. Bathroom set-up: tub/shower combo  Prior Level of Function: independent with mobility and ADLs  DME owned: none  Support Available/Caregiver Assistance: family    Objective:     Patient found with: colostomy, peripheral IV    Recent Surgery: * No surgery found *    General Precautions: Standard, fall   Orthopedic Precautions:N/A   Braces: N/A   Oxygen Device: room air      Exams:     Cognition:  · Oriented X 4  · Command following: Follows multistep verbal commands  · Communication: clear/fluent     Sensation:   o Light touch sensation: Intact BLEs     Gross Motor Coordination: No deficits noted during functional mobility tasks      Edema/Skin Integrity: None noted; RLE with dressing, unable to visually inspect woind     Postural examination/scapula alignment: no observable deficits     Lower Extremity Range of Motion:  o Right Lower Extremity: WFL  o Left Lower Extremity: WFL     Lower Extremity Strength:  o Right Lower Extremity: WFL  o Left Lower Extremity: WFL    Functional Mobility:    Bed Mobility:  · Supine > Sit with supervision  · Sit > Supine with supervision    Transfers:   · Sit <> Stand Transfer:  · 1st trial: Contact Guard Assistance from EOB with RW  · 2nd trial: SBA from EOB with RW             Gait:  · Distance: 300 ft  · Assistance level: Stand-by Assistance with progression to supervision  · Assistive Device: rolling walker  · Gait Assessment: antalgic gait pattern   · Decreased weightbearing through RLE 2/2 wound present    Balance:  · Dynamic Sitting: GOOD: Maintains balance through MODERATE excursions of active trunk movement  · Standing:  · Static: GOOD: Takes MODERATE challenges  from all directions   · Dynamic: GOOD: Needs SUPERVISION only during gait and able to self right with moderate LOB    Outcome Measure: AM-PAC 6 CLICK MOBILITY  Total Score:21     Patient/Caregiver Education:     Therapist educated pt/caregiver regarding:    PT POC and goals for therapy    Safety with mobility and fall risk    Instruction on use of call button and importance of calling nursing staff for assistance with mobility    Time provided for therapeutic counseling and discussion of current health disposition. All questions answered to satisfaction, within scope of PT practice     Patient/caregiver able to verbalize understanding and expressed no further questions this visit; will follow-up with pt/caregiver during current admit for additional questions/concerns within scope of practice.     White board updated.     Patient left supine with call button in reach and RW left in room to encourage ambulation.    GOALS:   Multidisciplinary Problems     Physical Therapy Goals     Not on file          Multidisciplinary Problems (Resolved)        Problem: Physical Therapy    Goal Priority Disciplines Outcome Goal Variances Interventions   Physical Therapy Goal   (Resolved)     PT, PT/OT Met     Description:  Pt does not require acute PT services at this time due to demonstrating safety with functional mobility with AD, including transfers and ambulation, thus no goals created.                           History:     Past Medical History:   Diagnosis Date    Pyoderma gangrenosa     UC (ulcerative colitis)        Past Surgical History:   Procedure Laterality Date    ILEOSTOMY      SINUS SURGERY         Time Tracking:     PT Received On: 04/12/22  PT Start Time: 1538     PT Stop Time: 1603  PT Total Time (min): 25 min     Billable Minutes: Evaluation 10 and Gait Training 15    04/12/2022  Lilia Prater, PT

## 2022-04-13 PROBLEM — E87.6 HYPOKALEMIA: Status: ACTIVE | Noted: 2022-04-13

## 2022-04-13 LAB
ALBUMIN SERPL BCP-MCNC: 2.5 G/DL (ref 3.5–5.2)
ALBUMIN SERPL BCP-MCNC: 2.6 G/DL (ref 3.5–5.2)
ALP SERPL-CCNC: 93 U/L (ref 55–135)
ALT SERPL W/O P-5'-P-CCNC: 20 U/L (ref 10–44)
ANION GAP SERPL CALC-SCNC: 11 MMOL/L (ref 8–16)
ANION GAP SERPL CALC-SCNC: 12 MMOL/L (ref 8–16)
ANISOCYTOSIS BLD QL SMEAR: SLIGHT
AST SERPL-CCNC: 25 U/L (ref 10–40)
BASOPHILS # BLD AUTO: 0.05 K/UL (ref 0–0.2)
BASOPHILS NFR BLD: 0.4 % (ref 0–1.9)
BILIRUB SERPL-MCNC: 0.4 MG/DL (ref 0.1–1)
BUN SERPL-MCNC: 7 MG/DL (ref 6–20)
BUN SERPL-MCNC: 8 MG/DL (ref 6–20)
CALCIUM SERPL-MCNC: 10 MG/DL (ref 8.7–10.5)
CALCIUM SERPL-MCNC: 9.5 MG/DL (ref 8.7–10.5)
CHLORIDE SERPL-SCNC: 103 MMOL/L (ref 95–110)
CHLORIDE SERPL-SCNC: 103 MMOL/L (ref 95–110)
CO2 SERPL-SCNC: 24 MMOL/L (ref 23–29)
CO2 SERPL-SCNC: 26 MMOL/L (ref 23–29)
CREAT SERPL-MCNC: 1.7 MG/DL (ref 0.5–1.4)
CREAT SERPL-MCNC: 1.8 MG/DL (ref 0.5–1.4)
CRP SERPL-MCNC: 73.5 MG/L (ref 0–8.2)
DIFFERENTIAL METHOD: ABNORMAL
EOSINOPHIL # BLD AUTO: 0.4 K/UL (ref 0–0.5)
EOSINOPHIL NFR BLD: 3.4 % (ref 0–8)
ERYTHROCYTE [DISTWIDTH] IN BLOOD BY AUTOMATED COUNT: 12.4 % (ref 11.5–14.5)
EST. GFR  (AFRICAN AMERICAN): 44.1 ML/MIN/1.73 M^2
EST. GFR  (AFRICAN AMERICAN): 47.3 ML/MIN/1.73 M^2
EST. GFR  (NON AFRICAN AMERICAN): 38.3 ML/MIN/1.73 M^2
EST. GFR  (NON AFRICAN AMERICAN): 41 ML/MIN/1.73 M^2
GLUCOSE SERPL-MCNC: 108 MG/DL (ref 70–110)
GLUCOSE SERPL-MCNC: 72 MG/DL (ref 70–110)
HCT VFR BLD AUTO: 36.1 % (ref 37–48.5)
HGB BLD-MCNC: 12.2 G/DL (ref 12–16)
HYPOCHROMIA BLD QL SMEAR: ABNORMAL
IMM GRANULOCYTES # BLD AUTO: 0.07 K/UL (ref 0–0.04)
IMM GRANULOCYTES NFR BLD AUTO: 0.6 % (ref 0–0.5)
LYMPHOCYTES # BLD AUTO: 1.2 K/UL (ref 1–4.8)
LYMPHOCYTES NFR BLD: 9.6 % (ref 18–48)
MAGNESIUM SERPL-MCNC: 1.7 MG/DL (ref 1.6–2.6)
MCH RBC QN AUTO: 28.1 PG (ref 27–31)
MCHC RBC AUTO-ENTMCNC: 33.8 G/DL (ref 32–36)
MCV RBC AUTO: 83 FL (ref 82–98)
MONOCYTES # BLD AUTO: 1.3 K/UL (ref 0.3–1)
MONOCYTES NFR BLD: 10.3 % (ref 4–15)
NEUTROPHILS # BLD AUTO: 9.4 K/UL (ref 1.8–7.7)
NEUTROPHILS NFR BLD: 75.7 % (ref 38–73)
NRBC BLD-RTO: 0 /100 WBC
OVALOCYTES BLD QL SMEAR: ABNORMAL
PHOSPHATE SERPL-MCNC: 2.6 MG/DL (ref 2.7–4.5)
PHOSPHATE SERPL-MCNC: 3.6 MG/DL (ref 2.7–4.5)
PLATELET # BLD AUTO: 321 K/UL (ref 150–450)
PMV BLD AUTO: ABNORMAL FL (ref 9.2–12.9)
POCT GLUCOSE: 189 MG/DL (ref 70–110)
POIKILOCYTOSIS BLD QL SMEAR: SLIGHT
POLYCHROMASIA BLD QL SMEAR: ABNORMAL
POTASSIUM SERPL-SCNC: 3.3 MMOL/L (ref 3.5–5.1)
POTASSIUM SERPL-SCNC: 3.8 MMOL/L (ref 3.5–5.1)
PROT SERPL-MCNC: 7.3 G/DL (ref 6–8.4)
RBC # BLD AUTO: 4.34 M/UL (ref 4–5.4)
SODIUM SERPL-SCNC: 139 MMOL/L (ref 136–145)
SODIUM SERPL-SCNC: 140 MMOL/L (ref 136–145)
WBC # BLD AUTO: 12.37 K/UL (ref 3.9–12.7)

## 2022-04-13 PROCEDURE — 11000001 HC ACUTE MED/SURG PRIVATE ROOM

## 2022-04-13 PROCEDURE — 99232 PR SUBSEQUENT HOSPITAL CARE,LEVL II: ICD-10-PCS | Mod: 95,,, | Performed by: INTERNAL MEDICINE

## 2022-04-13 PROCEDURE — 25000003 PHARM REV CODE 250: Performed by: INTERNAL MEDICINE

## 2022-04-13 PROCEDURE — 86140 C-REACTIVE PROTEIN: CPT | Performed by: INTERNAL MEDICINE

## 2022-04-13 PROCEDURE — 63600175 PHARM REV CODE 636 W HCPCS: Performed by: INTERNAL MEDICINE

## 2022-04-13 PROCEDURE — 84100 ASSAY OF PHOSPHORUS: CPT | Performed by: STUDENT IN AN ORGANIZED HEALTH CARE EDUCATION/TRAINING PROGRAM

## 2022-04-13 PROCEDURE — 83735 ASSAY OF MAGNESIUM: CPT | Performed by: STUDENT IN AN ORGANIZED HEALTH CARE EDUCATION/TRAINING PROGRAM

## 2022-04-13 PROCEDURE — 80053 COMPREHEN METABOLIC PANEL: CPT | Performed by: STUDENT IN AN ORGANIZED HEALTH CARE EDUCATION/TRAINING PROGRAM

## 2022-04-13 PROCEDURE — 85025 COMPLETE CBC W/AUTO DIFF WBC: CPT | Performed by: STUDENT IN AN ORGANIZED HEALTH CARE EDUCATION/TRAINING PROGRAM

## 2022-04-13 PROCEDURE — 99232 SBSQ HOSP IP/OBS MODERATE 35: CPT | Mod: 95,,, | Performed by: INTERNAL MEDICINE

## 2022-04-13 PROCEDURE — 80069 RENAL FUNCTION PANEL: CPT | Performed by: INTERNAL MEDICINE

## 2022-04-13 PROCEDURE — 36415 COLL VENOUS BLD VENIPUNCTURE: CPT | Performed by: INTERNAL MEDICINE

## 2022-04-13 PROCEDURE — 36415 COLL VENOUS BLD VENIPUNCTURE: CPT | Performed by: STUDENT IN AN ORGANIZED HEALTH CARE EDUCATION/TRAINING PROGRAM

## 2022-04-13 RX ORDER — INSULIN ASPART 100 [IU]/ML
0-5 INJECTION, SOLUTION INTRAVENOUS; SUBCUTANEOUS
Status: DISCONTINUED | OUTPATIENT
Start: 2022-04-13 | End: 2022-04-14 | Stop reason: HOSPADM

## 2022-04-13 RX ORDER — SODIUM CHLORIDE AND POTASSIUM CHLORIDE 150; 900 MG/100ML; MG/100ML
INJECTION, SOLUTION INTRAVENOUS CONTINUOUS
Status: DISCONTINUED | OUTPATIENT
Start: 2022-04-13 | End: 2022-04-14 | Stop reason: HOSPADM

## 2022-04-13 RX ORDER — IBUPROFEN 200 MG
24 TABLET ORAL
Status: DISCONTINUED | OUTPATIENT
Start: 2022-04-13 | End: 2022-04-14 | Stop reason: HOSPADM

## 2022-04-13 RX ORDER — POTASSIUM CHLORIDE 20 MEQ/1
40 TABLET, EXTENDED RELEASE ORAL ONCE
Status: COMPLETED | OUTPATIENT
Start: 2022-04-13 | End: 2022-04-13

## 2022-04-13 RX ORDER — IBUPROFEN 200 MG
16 TABLET ORAL
Status: DISCONTINUED | OUTPATIENT
Start: 2022-04-13 | End: 2022-04-14 | Stop reason: HOSPADM

## 2022-04-13 RX ORDER — GLUCAGON 1 MG
1 KIT INJECTION
Status: DISCONTINUED | OUTPATIENT
Start: 2022-04-13 | End: 2022-04-14 | Stop reason: HOSPADM

## 2022-04-13 RX ORDER — PANTOPRAZOLE SODIUM 40 MG/1
40 TABLET, DELAYED RELEASE ORAL DAILY
Status: DISCONTINUED | OUTPATIENT
Start: 2022-04-13 | End: 2022-04-14 | Stop reason: HOSPADM

## 2022-04-13 RX ADMIN — CITALOPRAM HYDROBROMIDE 40 MG: 20 TABLET ORAL at 08:04

## 2022-04-13 RX ADMIN — PANTOPRAZOLE SODIUM 40 MG: 40 TABLET, DELAYED RELEASE ORAL at 02:04

## 2022-04-13 RX ADMIN — POTASSIUM CHLORIDE 40 MEQ: 20 TABLET, EXTENDED RELEASE ORAL at 08:04

## 2022-04-13 RX ADMIN — PREDNISONE 60 MG: 50 TABLET ORAL at 08:04

## 2022-04-13 RX ADMIN — HYDROCODONE BITARTRATE AND ACETAMINOPHEN 1 TABLET: 5; 325 TABLET ORAL at 04:04

## 2022-04-13 RX ADMIN — SODIUM CHLORIDE AND POTASSIUM CHLORIDE: .9; .15 SOLUTION INTRAVENOUS at 04:04

## 2022-04-13 RX ADMIN — MORPHINE SULFATE 4 MG: 4 INJECTION INTRAVENOUS at 05:04

## 2022-04-13 RX ADMIN — CEFTRIAXONE 2 G: 2 INJECTION, SOLUTION INTRAVENOUS at 02:04

## 2022-04-13 NOTE — PROGRESS NOTES
[facility] Medicine  Telemedicine Progress Note    Patient Name: Lorelei Rodriguez  MRN: [id number]  Patient Class: IP- Inpatient   Admission Date: 4/7/2022  Length of Stay: 5 days  Attending Physician: Tracey Wright MD  Primary Care Provider: Batool Hinton MD          Subjective:     Principal Problem:Pyoderma gangrenosum        HPI:  Lorelei Rodriguez is a 26 y.o. female with ulcerative colitis who presented initially to [facility] ED with complaints of right leg wound for the past two weeks.  She reports that the wound initially started as a red, raised lesion for which she had a telemedicine visit with her dermatologist on Mar 22, 2022.  She was prescribed a course of doxycycline with which she was compliant.  Unfortunately, it seemed to worsen and she presented to the ED on Apr 2, 2022 and was prescribed a course of amoxicillin/clavulanate.  She also felt that there was no improvement and since that the lesion started to bleed a couple days ago.  She denies any fevers other than today which was 100.5° F. She denies any trauma to her right lower extremity.  She does volunteer that this lesion looks similar to two lesions on her right forearm that was due to pyoderma gangrenosum.  She has otherwise been in her usual state of health.        Overview/Hospital Course:  Pt transferred to [facility] and arrived on 4/9. Other than RLE wound, she remains HDS and grossly asymptomatic. Dermatology consulted for assistance for likely pyoderma gangrenosum; continuing coverage with abx for possible superimposed cellulitis.        This encounter was provided through telemedicine.  Patient was transferred to the telemedicine service on:  04/10/2022   The patient location is: [address] [place] [address] A admitted 4/7/2022  6:46 PM.  Present with the patient at the time of the telemed/virtual assessment: Telepresenter    Interval History/Overnight Events:     Nauseous with emesis X 1 today - Cr increased after elevated Vanc - no change in  UOP; IVF resumed - pathology returrned with PG and derm recommends prednisone therapy    Family member at bedside.    Review of Systems   Constitutional:  Negative for activity change, appetite change and fever.   Respiratory:  Negative for shortness of breath.    Cardiovascular:  Negative for chest pain.   Gastrointestinal:  Negative for diarrhea and vomiting.   Skin:  Positive for wound.      Inpatient Medications:  Scheduled Meds:   cefTRIAXone (ROCEPHIN) IVPB  2 g Intravenous Q24H    citalopram  40 mg Oral Daily    mupirocin   Nasal BID     Continuous Infusions:   sodium chloride 0.9% 100 mL/hr at 04/12/22 1003       PRN Meds:.calcium carbonate, diphenhydrAMINE, HYDROcodone-acetaminophen, melatonin, morphine, prochlorperazine, senna, simethicone      Objective:     Temp:  [96.8 °F (36 °C)-99.3 °F (37.4 °C)] 98.5 °F (36.9 °C)  Pulse:  [] 86  Resp:  [16-20] 18  SpO2:  [90 %-96 %] 94 %  BP: (113-132)/(70-79) 113/72      Intake/Output Summary (Last 24 hours) at 4/12/2022 1144  Last data filed at 4/11/2022 2230  Gross per 24 hour   Intake --   Output 500 ml   Net -500 ml          Body mass index is 32.66 kg/m².    Physical Exam  Vitals and nursing note reviewed.   Constitutional:       General: She is not in acute distress.     Appearance: Normal appearance.   HENT:      Head: Normocephalic and atraumatic.   Eyes:      General: No scleral icterus.        Right eye: No discharge.         Left eye: No discharge.      Extraocular Movements: Extraocular movements intact.   Cardiovascular:      Rate and Rhythm: Normal rate.   Pulmonary:      Effort: Pulmonary effort is normal. No tachypnea or respiratory distress.   Skin:     Comments: Right leg wound with decreased erythema near previous markings ;she remains with significant bruising and redness centrally; her mother notes decreased edema from admit   Neurological:      General: No focal deficit present.      Mental Status: She is alert and oriented to person,  place, and time.      Cranial Nerves: No cranial nerve deficit.      Motor: No weakness.   Psychiatric:         Attention and Perception: Attention normal.         Mood and Affect: Mood and affect normal.         Speech: Speech normal.         Behavior: Behavior is cooperative.         Thought Content: Thought content normal.        Labs:  Recent Results (from the past 24 hour(s))   Phosphorus    Collection Time: 04/12/22  5:21 AM   Result Value Ref Range    Phosphorus 4.1 2.7 - 4.5 mg/dL   Magnesium    Collection Time: 04/12/22  5:21 AM   Result Value Ref Range    Magnesium 1.8 1.6 - 2.6 mg/dL   Comprehensive Metabolic Panel    Collection Time: 04/12/22  5:21 AM   Result Value Ref Range    Sodium 140 136 - 145 mmol/L    Potassium 3.8 3.5 - 5.1 mmol/L    Chloride 103 95 - 110 mmol/L    CO2 29 23 - 29 mmol/L    Glucose 84 70 - 110 mg/dL    BUN 5 (L) 6 - 20 mg/dL    Creatinine 1.8 (H) 0.5 - 1.4 mg/dL    Calcium 9.9 8.7 - 10.5 mg/dL    Total Protein 7.3 6.0 - 8.4 g/dL    Albumin 2.6 (L) 3.5 - 5.2 g/dL    Total Bilirubin 0.8 0.1 - 1.0 mg/dL    Alkaline Phosphatase 92 55 - 135 U/L    AST 28 10 - 40 U/L    ALT 20 10 - 44 U/L    Anion Gap 8 8 - 16 mmol/L    eGFR if African American 44.1 (A) >60 mL/min/1.73 m^2    eGFR if non  38.3 (A) >60 mL/min/1.73 m^2   CBC Auto Differential    Collection Time: 04/12/22  5:21 AM   Result Value Ref Range    WBC 10.68 3.90 - 12.70 K/uL    RBC 4.44 4.00 - 5.40 M/uL    Hemoglobin 12.1 12.0 - 16.0 g/dL    Hematocrit 37.0 37.0 - 48.5 %    MCV 83 82 - 98 fL    MCH 27.3 27.0 - 31.0 pg    MCHC 32.7 32.0 - 36.0 g/dL    RDW 12.3 11.5 - 14.5 %    Platelets 365 150 - 450 K/uL    MPV 9.4 9.2 - 12.9 fL    Immature Granulocytes 0.3 0.0 - 0.5 %    Gran # (ANC) 7.6 1.8 - 7.7 K/uL    Immature Grans (Abs) 0.03 0.00 - 0.04 K/uL    Lymph # 1.4 1.0 - 4.8 K/uL    Mono # 1.1 (H) 0.3 - 1.0 K/uL    Eos # 0.5 0.0 - 0.5 K/uL    Baso # 0.05 0.00 - 0.20 K/uL    nRBC 0 0 /100 WBC    Gran % 71.5 38.0 -  73.0 %    Lymph % 12.8 (L) 18.0 - 48.0 %    Mono % 10.7 4.0 - 15.0 %    Eosinophil % 4.2 0.0 - 8.0 %    Basophil % 0.5 0.0 - 1.9 %    Differential Method Automated    Vancomycin, random    Collection Time: 04/12/22  5:21 AM   Result Value Ref Range    Vancomycin, Random 21.6 Not established ug/mL        Lab Results   Component Value Date    [id number] Negative 04/07/2022       Recent Labs   Lab 04/10/22  0423 04/11/22  0345 04/12/22  0521   WBC 9.67 9.51 10.68   LYMPH 9.6*  0.9* 14.8*  1.4 12.8*  1.4   HGB 12.7 11.7* 12.1   HCT 39.6 36.1* 37.0    303 365       Recent Labs   Lab 04/10/22  0423 04/11/22  0345 04/12/22  0521    143 140   K 3.9 2.9* 3.8    106 103   CO2 25 26 29   BUN 3* 3* 5*   CREATININE 0.9 1.0 1.8*   GLU 89 89 84   CALCIUM 8.8 9.1 9.9   MG 1.7 1.6 1.8   PHOS 2.6* 3.2 4.1       Recent Labs   Lab 04/10/22  0423 04/11/22  0345 04/12/22  0521   ALKPHOS 87 91 92   ALT 14 12 20   AST 18 18 28   ALBUMIN 2.7* 2.4* 2.6*   PROT 6.1 6.4 7.3   BILITOT 0.6 0.5 0.8          No results for input(s): DDIMER, FERRITIN, CRP, LDH, BNP, TROPONINI, CPK in the last 72 hours.    Invalid input(s): PROCALCITONIN      All labs within the last 24 hours were reviewed.     Microbiology:  Microbiology Results (last 7 days)       Procedure Component Value Units Date/Time    Culture, Anaerobe [[id number]] Collected: 04/09/22 1857    Order Status: Completed Specimen: Biopsy from Leg, Right Updated: 04/12/22 1103     Anaerobic Culture Culture in progress    Fungus culture [[id number]] Collected: 04/09/22 1857    Order Status: Completed Specimen: Biopsy from Leg, Right Updated: 04/12/22 0920     Fungus (Mycology) Culture Culture in progress    Blood culture [[id number]] Collected: 04/07/22 1954    Order Status: Completed Specimen: Blood from Peripheral, Antecubital, Right Updated: 04/12/22 0303     Blood Culture, Routine No Growth after 4 days.     Blood culture [[id number]] Collected: 04/07/22 2014    Order  Status: Completed Specimen: Blood from Peripheral, Antecubital, Left Updated: 04/12/22 0303     Blood Culture, Routine No Growth after 4 days.     AFB Culture & Smear [[id number]] Collected: 04/09/22 1857    Order Status: Completed Specimen: Biopsy from Leg, Right Updated: 04/11/22 1359     AFB Culture & Smear Culture in progress     AFB CULTURE STAIN No acid fast bacilli seen.    Aerobic culture [[id number]] Collected: 04/09/22 1857    Order Status: Completed Specimen: Biopsy from Leg, Right Updated: 04/11/22 0744     Aerobic Bacterial Culture No growth              Imaging      No results found for this or any previous visit.      US Upper Extremity Veins Right  Narrative: EXAMINATION:  US UPPER EXTREMITY VEINS RIGHT    CLINICAL HISTORY:  UE with edema after IV infiltration;    TECHNIQUE:  Duplex and color flow Doppler evaluation and dynamic compression was performed of the right upper extremity veins.    COMPARISON:  None    FINDINGS:  Central veins: The internal jugular, subclavian, and axillary veins are patent and free of thrombus.    Arm veins: The brachial and basilic veins are patent and compressible.  Noncompressible cephalic vein with occlusive thrombus.    Contralateral subclavian/internal jugular veins: The left subclavian and internal jugular veins are patent and free of thrombus.  Impression: Superficial thrombophlebitis with occlusion of the right cephalic vein.    No evidence of deep vein thrombus.    This report was flagged in Epic as abnormal.    Electronically signed by resident: Aneta Mina  Date:    04/11/2022  Time:    14:04    Electronically signed by: Raymundo Silveira MD  Date:    04/11/2022  Time:    14:11      All imaging within the last 24 hours was reviewed.       Discharge Planning   AFSHIN: 4/13/2022     Code Status: Full Code   Is the patient medically ready for discharge?: No    Reason for patient still in hospital (select all that apply): Patient trending condition, Laboratory test,  Treatment, and Consult recommendations  Discharge Plan A: Home with family   Discharge Delays: None known at this time        Assessment/Plan:      * Pyoderma gangrenosum  - Interval history and physical exam findings as described above  - Does appear to have history of less severe ulcerative skin lesions  - Not on any immune-modifying therapy  - Treating for possible overlying cellulitis as below  - Dermatology consulted, appreciate assistance - punch biopsy confirmed  - Wound care consulted - defer therapy to Derm; will just continue vaseline gauze with Kerlix  - PRN analgesics provided  - Continuing to monitor  -Derm recommends prednisone 60 mg daily therapy with GI eval for other alternative therapy to steroids    Ileostomy status  Patient without any complaints regarding her ileostomy.  She is able to independently provide care.      Class 1 obesity due to excess calories without serious comorbidity in adult  - Body mass index is 32.66 kg/m².  - Pt counseled on dietary and lifestyle modifications in relation to health  - Consider dietary/nutrition consult outpatient    Cellulitis of right leg  - Interval history and physical exam findings as described above  - Likely pyoderma gangrenosum, though given previous fever and soft BP, concern for concurrent cellulitis  - BCx negative  - wound culture with no growth so far  - Continue empiric vanc and change Zosyn to rocephin per ID recs end date of 4/13  - change IV on 4/11 due to infiltration with superficial thrombophlebitis found    Ulcerative colitis  - S/p total colectomy in 2017  - No current medication regimen      VTE Risk Mitigation (From admission, onward)         Ordered     IP VTE HIGH RISK PATIENT  Once         04/08/22 0052     Place sequential compression device  Until discontinued         04/08/22 0052                I have assessed these findings virtually using a telemed platform and with assistance of the bedside nurse.        The attending portion  of this evaluation, treatment, and documentation was performed per Tracey Wirght MD via Telemedicine AudioVisual using the secure [employer] software platform with 2 way audio/video. The provider was located off-site and the patient is located in the hospital. The aforementioned video software was utilized to document the relevant history and physical exam    Tracey Wright MD  Department of Hospital Medicine   [facility]

## 2022-04-13 NOTE — SUBJECTIVE & OBJECTIVE
This encounter was provided through telemedicine.  Patient was transferred to the telemedicine service on:  04/10/2022   The patient location is: 4/624 A admitted 4/7/2022  6:46 PM.  Present with the patient at the time of the telemed/virtual assessment: Telepresenter    Interval History/Overnight Events:     Patient with improving pain to leg; PT recommends walker for ambulation; Cr unchanged at 1.8; IVF started; Prednisone started; glucose monitoring with steroids; right UE edema improving    Patient's mother at bedside.    Review of Systems   Constitutional:  Negative for activity change, appetite change and fever.   Respiratory:  Negative for shortness of breath.    Cardiovascular:  Negative for chest pain.   Gastrointestinal:  Negative for diarrhea and vomiting.   Skin:  Positive for wound.      Inpatient Medications:  Scheduled Meds:   cefTRIAXone (ROCEPHIN) IVPB  2 g Intravenous Q24H    citalopram  40 mg Oral Daily    predniSONE  60 mg Oral Daily     Continuous Infusions:        PRN Meds:.calcium carbonate, diphenhydrAMINE, HYDROcodone-acetaminophen, melatonin, morphine, prochlorperazine, senna, simethicone      Objective:     Temp:  [97.6 °F (36.4 °C)-98.8 °F (37.1 °C)] 98.5 °F (36.9 °C)  Pulse:  [82-94] 82  Resp:  [16-20] 18  SpO2:  [93 %-97 %] 96 %  BP: (114-126)/(68-84) 126/78      Intake/Output Summary (Last 24 hours) at 4/13/2022 1156  Last data filed at 4/12/2022 2013  Gross per 24 hour   Intake 9500 ml   Output 500 ml   Net 9000 ml          Body mass index is 32.66 kg/m².    Physical Exam  Vitals and nursing note reviewed.   Constitutional:       General: She is not in acute distress.     Appearance: Normal appearance.   HENT:      Head: Normocephalic and atraumatic.   Eyes:      General: No scleral icterus.        Right eye: No discharge.         Left eye: No discharge.      Extraocular Movements: Extraocular movements intact.   Cardiovascular:      Rate and Rhythm: Normal rate.   Pulmonary:       Effort: Pulmonary effort is normal. No tachypnea or respiratory distress.   Skin:     Comments: Right leg wound covered with dressing and less drainage evident on dressing.    Neurological:      General: No focal deficit present.      Mental Status: She is alert and oriented to person, place, and time.      Cranial Nerves: No cranial nerve deficit.      Motor: No weakness.   Psychiatric:         Attention and Perception: Attention normal.         Mood and Affect: Mood and affect normal.         Speech: Speech normal.         Behavior: Behavior is cooperative.         Thought Content: Thought content normal.        Labs:  Recent Results (from the past 24 hour(s))   Phosphorus    Collection Time: 04/13/22  3:20 AM   Result Value Ref Range    Phosphorus 3.6 2.7 - 4.5 mg/dL   Magnesium    Collection Time: 04/13/22  3:20 AM   Result Value Ref Range    Magnesium 1.7 1.6 - 2.6 mg/dL   Comprehensive Metabolic Panel    Collection Time: 04/13/22  3:20 AM   Result Value Ref Range    Sodium 140 136 - 145 mmol/L    Potassium 3.3 (L) 3.5 - 5.1 mmol/L    Chloride 103 95 - 110 mmol/L    CO2 26 23 - 29 mmol/L    Glucose 72 70 - 110 mg/dL    BUN 7 6 - 20 mg/dL    Creatinine 1.8 (H) 0.5 - 1.4 mg/dL    Calcium 9.5 8.7 - 10.5 mg/dL    Total Protein 7.3 6.0 - 8.4 g/dL    Albumin 2.6 (L) 3.5 - 5.2 g/dL    Total Bilirubin 0.4 0.1 - 1.0 mg/dL    Alkaline Phosphatase 93 55 - 135 U/L    AST 25 10 - 40 U/L    ALT 20 10 - 44 U/L    Anion Gap 11 8 - 16 mmol/L    eGFR if African American 44.1 (A) >60 mL/min/1.73 m^2    eGFR if non  38.3 (A) >60 mL/min/1.73 m^2   CBC Auto Differential    Collection Time: 04/13/22  3:20 AM   Result Value Ref Range    WBC 12.37 3.90 - 12.70 K/uL    RBC 4.34 4.00 - 5.40 M/uL    Hemoglobin 12.2 12.0 - 16.0 g/dL    Hematocrit 36.1 (L) 37.0 - 48.5 %    MCV 83 82 - 98 fL    MCH 28.1 27.0 - 31.0 pg    MCHC 33.8 32.0 - 36.0 g/dL    RDW 12.4 11.5 - 14.5 %    Platelets 321 150 - 450 K/uL    MPV SEE COMMENT  9.2 - 12.9 fL    Immature Granulocytes 0.6 (H) 0.0 - 0.5 %    Gran # (ANC) 9.4 (H) 1.8 - 7.7 K/uL    Immature Grans (Abs) 0.07 (H) 0.00 - 0.04 K/uL    Lymph # 1.2 1.0 - 4.8 K/uL    Mono # 1.3 (H) 0.3 - 1.0 K/uL    Eos # 0.4 0.0 - 0.5 K/uL    Baso # 0.05 0.00 - 0.20 K/uL    nRBC 0 0 /100 WBC    Gran % 75.7 (H) 38.0 - 73.0 %    Lymph % 9.6 (L) 18.0 - 48.0 %    Mono % 10.3 4.0 - 15.0 %    Eosinophil % 3.4 0.0 - 8.0 %    Basophil % 0.4 0.0 - 1.9 %    Aniso Slight     Poik Slight     Poly Occasional     Hypo Occasional     Ovalocytes Occasional     Differential Method Automated    C-Reactive Protein    Collection Time: 04/13/22  3:20 AM   Result Value Ref Range    CRP 73.5 (H) 0.0 - 8.2 mg/L        Lab Results   Component Value Date    [id number] Negative 04/07/2022       Recent Labs   Lab 04/11/22 0345 04/12/22  0521 04/13/22  0320   WBC 9.51 10.68 12.37   LYMPH 14.8*  1.4 12.8*  1.4 9.6*  1.2   HGB 11.7* 12.1 12.2   HCT 36.1* 37.0 36.1*    365 321       Recent Labs   Lab 04/11/22 0345 04/12/22  0521 04/13/22  0320    140 140   K 2.9* 3.8 3.3*    103 103   CO2 26 29 26   BUN 3* 5* 7   CREATININE 1.0 1.8* 1.8*   GLU 89 84 72   CALCIUM 9.1 9.9 9.5   MG 1.6 1.8 1.7   PHOS 3.2 4.1 3.6       Recent Labs   Lab 04/11/22  0345 04/12/22  0521 04/13/22  0320   ALKPHOS 91 92 93   ALT 12 20 20   AST 18 28 25   ALBUMIN 2.4* 2.6* 2.6*   PROT 6.4 7.3 7.3   BILITOT 0.5 0.8 0.4          Recent Labs     04/13/22  0320   CRP 73.5*         All labs within the last 24 hours were reviewed.     Microbiology:  Microbiology Results (last 7 days)       Procedure Component Value Units Date/Time    Aerobic culture [[id number]] Collected: 04/09/22 1857    Order Status: Completed Specimen: Biopsy from Leg, Right Updated: 04/12/22 1347     Aerobic Bacterial Culture No growth    Culture, Anaerobe [[id number]] Collected: 04/09/22 1857    Order Status: Completed Specimen: Biopsy from Leg, Right Updated: 04/12/22 1103      Anaerobic Culture Culture in progress    Fungus culture [[id number]] Collected: 04/09/22 1857    Order Status: Completed Specimen: Biopsy from Leg, Right Updated: 04/12/22 0920     Fungus (Mycology) Culture Culture in progress    Blood culture [[id number]] Collected: 04/07/22 1954    Order Status: Completed Specimen: Blood from Peripheral, Antecubital, Right Updated: 04/12/22 0303     Blood Culture, Routine No Growth after 4 days.     Blood culture [[id number]] Collected: 04/07/22 2014    Order Status: Completed Specimen: Blood from Peripheral, Antecubital, Left Updated: 04/12/22 0303     Blood Culture, Routine No Growth after 4 days.     AFB Culture & Smear [[id number]] Collected: 04/09/22 1857    Order Status: Completed Specimen: Biopsy from Leg, Right Updated: 04/11/22 1359     AFB Culture & Smear Culture in progress     AFB CULTURE STAIN No acid fast bacilli seen.              Imaging      No results found for this or any previous visit.      US Upper Extremity Veins Right  Narrative: EXAMINATION:  US UPPER EXTREMITY VEINS RIGHT    CLINICAL HISTORY:  UE with edema after IV infiltration;    TECHNIQUE:  Duplex and color flow Doppler evaluation and dynamic compression was performed of the right upper extremity veins.    COMPARISON:  None    FINDINGS:  Central veins: The internal jugular, subclavian, and axillary veins are patent and free of thrombus.    Arm veins: The brachial and basilic veins are patent and compressible.  Noncompressible cephalic vein with occlusive thrombus.    Contralateral subclavian/internal jugular veins: The left subclavian and internal jugular veins are patent and free of thrombus.  Impression: Superficial thrombophlebitis with occlusion of the right cephalic vein.    No evidence of deep vein thrombus.    This report was flagged in Epic as abnormal.    Electronically signed by resident: Aneta Mina  Date:    04/11/2022  Time:    14:04    Electronically signed by: Raymundo Silveira  MD  Date:    04/11/2022  Time:    14:11      All imaging within the last 24 hours was reviewed.       Discharge Planning   AFSHIN: 4/14/2022     Code Status: Full Code   Is the patient medically ready for discharge?: No    Reason for patient still in hospital (select all that apply): Patient trending condition, Laboratory test, Treatment, and Consult recommendations  Discharge Plan A: Home with family   Discharge Delays: None known at this time

## 2022-04-13 NOTE — PLAN OF CARE
Problem: Adult Inpatient Plan of Care  Goal: Plan of Care Review  Outcome: Ongoing, Progressing  Goal: Patient-Specific Goal (Individualized)  Outcome: Ongoing, Progressing  Goal: Absence of Hospital-Acquired Illness or Injury  Outcome: Ongoing, Progressing  Goal: Optimal Comfort and Wellbeing  Outcome: Ongoing, Progressing  Goal: Readiness for Transition of Care  Outcome: Ongoing, Progressing   VSS. Pain control,no more signs of anxiety.Call light and personal items within reach.

## 2022-04-13 NOTE — ASSESSMENT & PLAN NOTE
- Interval history and physical exam findings as described above  - Does appear to have history of less severe ulcerative skin lesions  - Not on any immune-modifying therapy  - Treating for possible overlying cellulitis as below  - Dermatology consulted, appreciate assistance - punch biopsy confirmed  - Wound care consulted - defer therapy to Derm; will just continue vaseline gauze with Kerlix  - PRN analgesics provided  - Continuing to monitor  -Derm recommends prednisone 60 mg daily therapy with GI eval for other alternative therapy to steroids

## 2022-04-14 VITALS
SYSTOLIC BLOOD PRESSURE: 102 MMHG | WEIGHT: 172.81 LBS | BODY MASS INDEX: 32.63 KG/M2 | RESPIRATION RATE: 18 BRPM | TEMPERATURE: 98 F | HEART RATE: 70 BPM | HEIGHT: 61 IN | DIASTOLIC BLOOD PRESSURE: 59 MMHG | OXYGEN SATURATION: 94 %

## 2022-04-14 LAB
ALBUMIN SERPL BCP-MCNC: 2.5 G/DL (ref 3.5–5.2)
ALP SERPL-CCNC: 78 U/L (ref 55–135)
ALT SERPL W/O P-5'-P-CCNC: 16 U/L (ref 10–44)
ANION GAP SERPL CALC-SCNC: 12 MMOL/L (ref 8–16)
AST SERPL-CCNC: 16 U/L (ref 10–40)
BACTERIA SPEC AEROBE CULT: NO GROWTH
BASOPHILS # BLD AUTO: 0.06 K/UL (ref 0–0.2)
BASOPHILS NFR BLD: 0.4 % (ref 0–1.9)
BILIRUB SERPL-MCNC: 0.2 MG/DL (ref 0.1–1)
BUN SERPL-MCNC: 13 MG/DL (ref 6–20)
CALCIUM SERPL-MCNC: 9.6 MG/DL (ref 8.7–10.5)
CHLORIDE SERPL-SCNC: 105 MMOL/L (ref 95–110)
CO2 SERPL-SCNC: 22 MMOL/L (ref 23–29)
CREAT SERPL-MCNC: 1.4 MG/DL (ref 0.5–1.4)
DIFFERENTIAL METHOD: ABNORMAL
EOSINOPHIL # BLD AUTO: 0 K/UL (ref 0–0.5)
EOSINOPHIL NFR BLD: 0.1 % (ref 0–8)
ERYTHROCYTE [DISTWIDTH] IN BLOOD BY AUTOMATED COUNT: 12.4 % (ref 11.5–14.5)
EST. GFR  (AFRICAN AMERICAN): 59.8 ML/MIN/1.73 M^2
EST. GFR  (NON AFRICAN AMERICAN): 51.9 ML/MIN/1.73 M^2
GLUCOSE SERPL-MCNC: 94 MG/DL (ref 70–110)
HCT VFR BLD AUTO: 36 % (ref 37–48.5)
HGB BLD-MCNC: 12.3 G/DL (ref 12–16)
IMM GRANULOCYTES # BLD AUTO: 0.14 K/UL (ref 0–0.04)
IMM GRANULOCYTES NFR BLD AUTO: 0.8 % (ref 0–0.5)
LYMPHOCYTES # BLD AUTO: 1.4 K/UL (ref 1–4.8)
LYMPHOCYTES NFR BLD: 8.4 % (ref 18–48)
MAGNESIUM SERPL-MCNC: 1.9 MG/DL (ref 1.6–2.6)
MCH RBC QN AUTO: 27.7 PG (ref 27–31)
MCHC RBC AUTO-ENTMCNC: 34.2 G/DL (ref 32–36)
MCV RBC AUTO: 81 FL (ref 82–98)
MONOCYTES # BLD AUTO: 1.5 K/UL (ref 0.3–1)
MONOCYTES NFR BLD: 8.5 % (ref 4–15)
NEUTROPHILS # BLD AUTO: 13.9 K/UL (ref 1.8–7.7)
NEUTROPHILS NFR BLD: 81.8 % (ref 38–73)
NRBC BLD-RTO: 0 /100 WBC
PHOSPHATE SERPL-MCNC: 3.8 MG/DL (ref 2.7–4.5)
PLATELET # BLD AUTO: 314 K/UL (ref 150–450)
PMV BLD AUTO: 10.2 FL (ref 9.2–12.9)
POCT GLUCOSE: 118 MG/DL (ref 70–110)
POCT GLUCOSE: 177 MG/DL (ref 70–110)
POCT GLUCOSE: 87 MG/DL (ref 70–110)
POTASSIUM SERPL-SCNC: 3.8 MMOL/L (ref 3.5–5.1)
PROT SERPL-MCNC: 7.1 G/DL (ref 6–8.4)
RBC # BLD AUTO: 4.44 M/UL (ref 4–5.4)
SODIUM SERPL-SCNC: 139 MMOL/L (ref 136–145)
WBC # BLD AUTO: 16.97 K/UL (ref 3.9–12.7)

## 2022-04-14 PROCEDURE — 83735 ASSAY OF MAGNESIUM: CPT | Performed by: STUDENT IN AN ORGANIZED HEALTH CARE EDUCATION/TRAINING PROGRAM

## 2022-04-14 PROCEDURE — 84100 ASSAY OF PHOSPHORUS: CPT | Performed by: STUDENT IN AN ORGANIZED HEALTH CARE EDUCATION/TRAINING PROGRAM

## 2022-04-14 PROCEDURE — 99239 PR HOSPITAL DISCHARGE DAY,>30 MIN: ICD-10-PCS | Mod: 95,,, | Performed by: INTERNAL MEDICINE

## 2022-04-14 PROCEDURE — 25000003 PHARM REV CODE 250: Performed by: INTERNAL MEDICINE

## 2022-04-14 PROCEDURE — 36415 COLL VENOUS BLD VENIPUNCTURE: CPT | Performed by: STUDENT IN AN ORGANIZED HEALTH CARE EDUCATION/TRAINING PROGRAM

## 2022-04-14 PROCEDURE — 63600175 PHARM REV CODE 636 W HCPCS: Performed by: INTERNAL MEDICINE

## 2022-04-14 PROCEDURE — 99239 HOSP IP/OBS DSCHRG MGMT >30: CPT | Mod: 95,,, | Performed by: INTERNAL MEDICINE

## 2022-04-14 PROCEDURE — 80053 COMPREHEN METABOLIC PANEL: CPT | Performed by: STUDENT IN AN ORGANIZED HEALTH CARE EDUCATION/TRAINING PROGRAM

## 2022-04-14 PROCEDURE — 85025 COMPLETE CBC W/AUTO DIFF WBC: CPT | Performed by: STUDENT IN AN ORGANIZED HEALTH CARE EDUCATION/TRAINING PROGRAM

## 2022-04-14 RX ORDER — HYDROCODONE BITARTRATE AND ACETAMINOPHEN 5; 325 MG/1; MG/1
1 TABLET ORAL EVERY 8 HOURS PRN
Qty: 20 TABLET | Refills: 0 | Status: SHIPPED | OUTPATIENT
Start: 2022-04-14 | End: 2022-05-20

## 2022-04-14 RX ORDER — PREDNISONE 20 MG/1
60 TABLET ORAL DAILY
Qty: 90 TABLET | Refills: 0 | Status: SHIPPED | OUTPATIENT
Start: 2022-04-15 | End: 2022-04-26

## 2022-04-14 RX ORDER — PANTOPRAZOLE SODIUM 40 MG/1
40 TABLET, DELAYED RELEASE ORAL DAILY
Qty: 30 TABLET | Refills: 1 | Status: SHIPPED | OUTPATIENT
Start: 2022-04-15 | End: 2022-11-14 | Stop reason: ALTCHOICE

## 2022-04-14 RX ADMIN — PREDNISONE 60 MG: 50 TABLET ORAL at 08:04

## 2022-04-14 RX ADMIN — PANTOPRAZOLE SODIUM 40 MG: 40 TABLET, DELAYED RELEASE ORAL at 08:04

## 2022-04-14 RX ADMIN — CITALOPRAM HYDROBROMIDE 40 MG: 20 TABLET ORAL at 08:04

## 2022-04-14 NOTE — PHYSICIAN QUERY
PT Name: Lorelei Rodriguez  MR #: [id number]     DOCUMENTATION CLARIFICATION     CDS/: ALLEY Hollisn, RN, CDS               Contact information:[email or web address]   This form is a permanent document in the medical record.    Query Date: April 14, 2022    By submitting this query, we are merely seeking further clarification of documentation.  Please utilize your independent clinical judgment when addressing the question(s) below.    The Medical Record contains the following:   Indicator Supporting Clinical Findings Location in Medical Record    Kidney (Renal) Insufficiency      Kidney (Renal) Failure/Injury     X Nephrotoxic Agents  Elevated Cr after mildly elevated Vanc - discharge once Cr improved; IVF started    Dr. Wright, 4/13   X BUN/Creatinine           GFR  04/8 04/9 4/10 04/11 04/12 4/13 04/13 04/14   BUN 6 3 (L) 3 (L) 3 (L) 5 (L) 7 8 13   Creatinine 0.6 0.7 0.9 1.0 1.8 (H) 1.8 (H) 1.7 (H) 1.4   EGFR >60  >60.0  >60.0  >60.0  38.3  38.3  41.0  51.9       Cr increased after elevated Vanc - no change in UOP; IVF resumed     Cr unchanged at 1.8, IV started     Elevated Cr after mildly elevated Vanc - discharge once Cr improved; IVF started    Lab 4/8-4/14                Dr. Kyle VYAS, 4/12     , Dr. Wright, 4/13    Urine: Casts         Eosinophils      Dehydration      Nausea/Vomiting      Dialysis/CRRT     X Treatment  Cr increased after elevated Vanc - no change in UOP; IVF resumed     Cr unchanged at 1.8, IV started     0.9% NaCl at 125 ml/hr     0.9% NaCl with KCL 20  mEQ at 100 ml/hr    Dr. Kyle VYAS, 4/12     , Dr. Wright, 4/13     MAR 4/12   X Other  The patients serum creatinine has increased and meets criteria for ROSALINO     Patients ROSALINO persists and her serum creatinine has increased to 1.8    Pharmacy, 4/11 at 723 AM     Pharmacy, 4/12 at 717 AM       [facility] approved diagnostic criteria for acute kidney injury is based on KDIGO criteria:    An increase in serum creatinine > 0.3mg/dl  within 48 hours  OR  Increase in serum creatinine to > 1.5x baseline, which is known or presumed to have occurred within the prior 7 days  OR  Urine volume <0.5 ml/kg/hr for 6 hours       The clinical guidelines noted above are only a system guideline. It does not replace the providers clinical judgment.     Provider, please specify the diagnosis or diagnoses associated with above clinical findings.     [    ] Unspecified Acute Kidney Failure/Injury       [    ] Other Acute Kidney Failure/Injury (please specify): ____________       [  xx  ] Acute Renal Insufficiency  - Consider if SCr rise is transient and normalizes quickly with no efforts at real resuscitation of vital signs and perfusion     [    ] Other (please specify): _______________________________     [  ] Clinically Undetermined         Please document in your progress notes daily for the duration of treatment until resolved and include in your discharge summary.    References:   KDIGO Clinical Practice Guideline for Acute Kidney Injury. (2012, March). Retrieved October 21, 2020, from https://kdigo.org/wp-content/uploads/2016/10/DMITS-0554-DDM-Guideline-English.pdf    MARIO Casarez MD, RAJ Singh MD, & MARK Macias MD. (1960). Renal medullary necrosis [Abstract]. The American Journal of Medicine, 29(1), 132-156. Doi:https://www.sciencedirect.com/science/article/abs/pii/7908593531097852    MARK Herzog MD, & AMBIKA Murray MD, MS. (2020, June 18). Definition and staging of chronic kidney disease in adults ([id number] RAJ Rob MD, ScD & [id number] BILLY Saini MD, MSc, Eds.). Retrieved October 21, 2020, from https://www.[employer].EmpowrNet/contents/definition-and-staging-of-chronic-kidney-disease-in-adults?search=ckd%20staging&source=search_result&selectedTitle=1~150&usage_type=default&display_rank=1     JULIANA Nelson MD, FACP. (2015, Jody 15). Acute kidney injury revisited. Retrieved October 21, 2020, from  https://acphospitalist.org/archives/2015/06/coding-acute-kidney-injury.htm    RACHELL Montoya MD. (2019, July). Renal Cortical Necrosis. Retrieved October 21, 2020, from https://www.Cityzen[employer]/professional/genitourinary-disorders/renovascular-disorders/renal-cortical-necrosis    Form No. 88290

## 2022-04-14 NOTE — DISCHARGE INSTRUCTIONS
Patient family instructed to ensure all medications, appointments and other instructions from other care team members are followed. Patient and family verbalized understanding.

## 2022-04-14 NOTE — PLAN OF CARE
[facility]  Discharge Final Note    Primary Care Provider: Batool Hinton MD    Expected Discharge Date: 4/14/2022     Future Appointments   Date Time Provider Department Center   4/26/2022  1:00 PM DERMATOLOGY [facility]       Final Discharge Note (most recent)     Final Note - 04/14/22 1019        Final Note    Assessment Type Final Discharge Note     Anticipated Discharge Disposition Home or Self Care     What phone number can be called within the next 1-3 days to see how you are doing after discharge? [phone]     Hospital Resources/Appts/Education Provided Appointments scheduled and added to AVS        Post-Acute Status    Discharge Delays None known at this time                 Important Message from Medicare             Contact Info     Batool Hinton MD   Specialty: Internal Medicine   Relationship: PCP - General    [address] [place] [address]  [place] [address]   Phone: [phone]       Next Steps: Schedule an appointment as soon as possible for a visit in 4 day(s)    Instructions: For discharge from hospital follow up  1015am    [facility] DERMATOLOGY   Specialty: Dermatology    [address]  [facility] [address]   Phone: [phone]       Next Steps: Go on 4/26/2022    Instructions: As Scheduled, As previously planned    [facility] GASTROENTEROLOGY   Specialty: Gastroenterology    [address]  [facility] [address]   Phone: [phone]       Next Steps: Schedule an appointment as soon as possible for a visit in 1 week(s)    Instructions: For discharge from hospital follow up

## 2022-04-14 NOTE — PROGRESS NOTES
[facility] Medicine  Telemedicine Progress Note    Patient Name: Lorelei Rodriguez  MRN: [id number]  Patient Class: IP- Inpatient   Admission Date: 4/7/2022  Length of Stay: 6 days  Attending Physician: Tracey Wright MD  Primary Care Provider: Batool Hinton MD          Subjective:     Principal Problem:Pyoderma gangrenosum        HPI:  Lorelei Rodriguez is a 26 y.o. female with ulcerative colitis who presented initially to [facility] ED with complaints of right leg wound for the past two weeks.  She reports that the wound initially started as a red, raised lesion for which she had a telemedicine visit with her dermatologist on Mar 22, 2022.  She was prescribed a course of doxycycline with which she was compliant.  Unfortunately, it seemed to worsen and she presented to the ED on Apr 2, 2022 and was prescribed a course of amoxicillin/clavulanate.  She also felt that there was no improvement and since that the lesion started to bleed a couple days ago.  She denies any fevers other than today which was 100.5° F. She denies any trauma to her right lower extremity.  She does volunteer that this lesion looks similar to two lesions on her right forearm that was due to pyoderma gangrenosum.  She has otherwise been in her usual state of health.        Overview/Hospital Course:  Pt transferred to [facility] and arrived on 4/9. Other than RLE wound, she remains HDS and grossly asymptomatic. Dermatology consulted for assistance for likely pyoderma gangrenosum; continuing coverage with abx for possible superimposed cellulitis.        This encounter was provided through telemedicine.  Patient was transferred to the telemedicine service on:  04/10/2022   The patient location is: [address] [place] [address] A admitted 4/7/2022  6:46 PM.  Present with the patient at the time of the telemed/virtual assessment: Telepresenter    Interval History/Overnight Events:     Patient with improving pain to leg; PT recommends walker for ambulation; Cr  unchanged at 1.8; IVF started; Prednisone started; glucose monitoring with steroids; right UE edema improving    Patient's mother at bedside.    Review of Systems   Constitutional:  Negative for activity change, appetite change and fever.   Respiratory:  Negative for shortness of breath.    Cardiovascular:  Negative for chest pain.   Gastrointestinal:  Negative for diarrhea and vomiting.   Skin:  Positive for wound.      Inpatient Medications:  Scheduled Meds:   cefTRIAXone (ROCEPHIN) IVPB  2 g Intravenous Q24H    citalopram  40 mg Oral Daily    predniSONE  60 mg Oral Daily     Continuous Infusions:        PRN Meds:.calcium carbonate, diphenhydrAMINE, HYDROcodone-acetaminophen, melatonin, morphine, prochlorperazine, senna, simethicone      Objective:     Temp:  [97.6 °F (36.4 °C)-98.8 °F (37.1 °C)] 98.5 °F (36.9 °C)  Pulse:  [82-94] 82  Resp:  [16-20] 18  SpO2:  [93 %-97 %] 96 %  BP: (114-126)/(68-84) 126/78      Intake/Output Summary (Last 24 hours) at 4/13/2022 1156  Last data filed at 4/12/2022 2013  Gross per 24 hour   Intake 9500 ml   Output 500 ml   Net 9000 ml          Body mass index is 32.66 kg/m².    Physical Exam  Vitals and nursing note reviewed.   Constitutional:       General: She is not in acute distress.     Appearance: Normal appearance.   HENT:      Head: Normocephalic and atraumatic.   Eyes:      General: No scleral icterus.        Right eye: No discharge.         Left eye: No discharge.      Extraocular Movements: Extraocular movements intact.   Cardiovascular:      Rate and Rhythm: Normal rate.   Pulmonary:      Effort: Pulmonary effort is normal. No tachypnea or respiratory distress.   Skin:     Comments: Right leg wound covered with dressing and less drainage evident on dressing.    Neurological:      General: No focal deficit present.      Mental Status: She is alert and oriented to person, place, and time.      Cranial Nerves: No cranial nerve deficit.      Motor: No weakness.    Psychiatric:         Attention and Perception: Attention normal.         Mood and Affect: Mood and affect normal.         Speech: Speech normal.         Behavior: Behavior is cooperative.         Thought Content: Thought content normal.        Labs:  Recent Results (from the past 24 hour(s))   Phosphorus    Collection Time: 04/13/22  3:20 AM   Result Value Ref Range    Phosphorus 3.6 2.7 - 4.5 mg/dL   Magnesium    Collection Time: 04/13/22  3:20 AM   Result Value Ref Range    Magnesium 1.7 1.6 - 2.6 mg/dL   Comprehensive Metabolic Panel    Collection Time: 04/13/22  3:20 AM   Result Value Ref Range    Sodium 140 136 - 145 mmol/L    Potassium 3.3 (L) 3.5 - 5.1 mmol/L    Chloride 103 95 - 110 mmol/L    CO2 26 23 - 29 mmol/L    Glucose 72 70 - 110 mg/dL    BUN 7 6 - 20 mg/dL    Creatinine 1.8 (H) 0.5 - 1.4 mg/dL    Calcium 9.5 8.7 - 10.5 mg/dL    Total Protein 7.3 6.0 - 8.4 g/dL    Albumin 2.6 (L) 3.5 - 5.2 g/dL    Total Bilirubin 0.4 0.1 - 1.0 mg/dL    Alkaline Phosphatase 93 55 - 135 U/L    AST 25 10 - 40 U/L    ALT 20 10 - 44 U/L    Anion Gap 11 8 - 16 mmol/L    eGFR if African American 44.1 (A) >60 mL/min/1.73 m^2    eGFR if non  38.3 (A) >60 mL/min/1.73 m^2   CBC Auto Differential    Collection Time: 04/13/22  3:20 AM   Result Value Ref Range    WBC 12.37 3.90 - 12.70 K/uL    RBC 4.34 4.00 - 5.40 M/uL    Hemoglobin 12.2 12.0 - 16.0 g/dL    Hematocrit 36.1 (L) 37.0 - 48.5 %    MCV 83 82 - 98 fL    MCH 28.1 27.0 - 31.0 pg    MCHC 33.8 32.0 - 36.0 g/dL    RDW 12.4 11.5 - 14.5 %    Platelets 321 150 - 450 K/uL    MPV SEE COMMENT 9.2 - 12.9 fL    Immature Granulocytes 0.6 (H) 0.0 - 0.5 %    Gran # (ANC) 9.4 (H) 1.8 - 7.7 K/uL    Immature Grans (Abs) 0.07 (H) 0.00 - 0.04 K/uL    Lymph # 1.2 1.0 - 4.8 K/uL    Mono # 1.3 (H) 0.3 - 1.0 K/uL    Eos # 0.4 0.0 - 0.5 K/uL    Baso # 0.05 0.00 - 0.20 K/uL    nRBC 0 0 /100 WBC    Gran % 75.7 (H) 38.0 - 73.0 %    Lymph % 9.6 (L) 18.0 - 48.0 %    Mono % 10.3 4.0 -  15.0 %    Eosinophil % 3.4 0.0 - 8.0 %    Basophil % 0.4 0.0 - 1.9 %    Aniso Slight     Poik Slight     Poly Occasional     Hypo Occasional     Ovalocytes Occasional     Differential Method Automated    C-Reactive Protein    Collection Time: 04/13/22  3:20 AM   Result Value Ref Range    CRP 73.5 (H) 0.0 - 8.2 mg/L        Lab Results   Component Value Date    JRH23VGIYUKO Negative 04/07/2022       Recent Labs   Lab 04/11/22 0345 04/12/22  0521 04/13/22  0320   WBC 9.51 10.68 12.37   LYMPH 14.8*  1.4 12.8*  1.4 9.6*  1.2   HGB 11.7* 12.1 12.2   HCT 36.1* 37.0 36.1*    365 321       Recent Labs   Lab 04/11/22 0345 04/12/22  0521 04/13/22  0320    140 140   K 2.9* 3.8 3.3*    103 103   CO2 26 29 26   BUN 3* 5* 7   CREATININE 1.0 1.8* 1.8*   GLU 89 84 72   CALCIUM 9.1 9.9 9.5   MG 1.6 1.8 1.7   PHOS 3.2 4.1 3.6       Recent Labs   Lab 04/11/22 0345 04/12/22  0521 04/13/22  0320   ALKPHOS 91 92 93   ALT 12 20 20   AST 18 28 25   ALBUMIN 2.4* 2.6* 2.6*   PROT 6.4 7.3 7.3   BILITOT 0.5 0.8 0.4          Recent Labs     04/13/22  0320   CRP 73.5*         All labs within the last 24 hours were reviewed.     Microbiology:  Microbiology Results (last 7 days)       Procedure Component Value Units Date/Time    Aerobic culture [[id number]] Collected: 04/09/22 1857    Order Status: Completed Specimen: Biopsy from Leg, Right Updated: 04/12/22 1347     Aerobic Bacterial Culture No growth    Culture, Anaerobe [[id number]] Collected: 04/09/22 1857    Order Status: Completed Specimen: Biopsy from Leg, Right Updated: 04/12/22 1103     Anaerobic Culture Culture in progress    Fungus culture [[id number]] Collected: 04/09/22 1857    Order Status: Completed Specimen: Biopsy from Leg, Right Updated: 04/12/22 0920     Fungus (Mycology) Culture Culture in progress    Blood culture [[id number]] Collected: 04/07/22 1954    Order Status: Completed Specimen: Blood from Peripheral, Antecubital, Right Updated: 04/12/22 0308      Blood Culture, Routine No Growth after 4 days.     Blood culture [[id number]] Collected: 04/07/22 2014    Order Status: Completed Specimen: Blood from Peripheral, Antecubital, Left Updated: 04/12/22 0303     Blood Culture, Routine No Growth after 4 days.     AFB Culture & Smear [[id number]] Collected: 04/09/22 1857    Order Status: Completed Specimen: Biopsy from Leg, Right Updated: 04/11/22 1359     AFB Culture & Smear Culture in progress     AFB CULTURE STAIN No acid fast bacilli seen.              Imaging      No results found for this or any previous visit.      US Upper Extremity Veins Right  Narrative: EXAMINATION:  US UPPER EXTREMITY VEINS RIGHT    CLINICAL HISTORY:  UE with edema after IV infiltration;    TECHNIQUE:  Duplex and color flow Doppler evaluation and dynamic compression was performed of the right upper extremity veins.    COMPARISON:  None    FINDINGS:  Central veins: The internal jugular, subclavian, and axillary veins are patent and free of thrombus.    Arm veins: The brachial and basilic veins are patent and compressible.  Noncompressible cephalic vein with occlusive thrombus.    Contralateral subclavian/internal jugular veins: The left subclavian and internal jugular veins are patent and free of thrombus.  Impression: Superficial thrombophlebitis with occlusion of the right cephalic vein.    No evidence of deep vein thrombus.    This report was flagged in Epic as abnormal.    Electronically signed by resident: Aneta Mina  Date:    04/11/2022  Time:    14:04    Electronically signed by: Raymundo Silveira MD  Date:    04/11/2022  Time:    14:11      All imaging within the last 24 hours was reviewed.       Discharge Planning   AFSHIN: 4/14/2022     Code Status: Full Code   Is the patient medically ready for discharge?: No    Reason for patient still in hospital (select all that apply): Patient trending condition, Laboratory test, Treatment, and Consult recommendations  Discharge Plan A: Home with  family   Discharge Delays: None known at this time        Assessment/Plan:      * Pyoderma gangrenosum  - Interval history and physical exam findings as described above  - Does appear to have history of less severe ulcerative skin lesions  - Not on any immune-modifying therapy  - Treating for possible overlying cellulitis as below  - Dermatology consulted, appreciate assistance - punch biopsy confirmed  - Wound care consulted - defer therapy to Derm; will just continue vaseline gauze with Kerlix  - PRN analgesics provided  - Continuing to monitor  -Derm recommends prednisone 60 mg daily therapy with GI eval for other alternative therapy to steroids  -glucose monitoring and pantoprazole ordered    Hypokalemia  replete as indicated for goal K+ of 4      Ileostomy status  Patient without any complaints regarding her ileostomy.  She is able to independently provide care.      Class 1 obesity due to excess calories without serious comorbidity in adult  - Body mass index is 32.66 kg/m².  - Pt counseled on dietary and lifestyle modifications in relation to health  - Consider dietary/nutrition consult outpatient    Cellulitis of right leg  - Interval history and physical exam findings as described above  - Likely pyoderma gangrenosum, though given previous fever and soft BP, concern for concurrent cellulitis  - BCx negative  - wound culture with no growth so far  - Continue empiric vanc and change Zosyn to rocephin per ID recs end date of 4/13  - change IV on 4/11 due to infiltration with superficial thrombophlebitis found on US  -elevated Cr after mildly elevated Vanc - discharge once Cr improved; IVF started    Ulcerative colitis  - S/p total colectomy in 2017  - No current medication regimen  -referral to GI for evaluation for biologic therapy      VTE Risk Mitigation (From admission, onward)         Ordered     IP VTE HIGH RISK PATIENT  Once         04/08/22 0052     Place sequential compression device  Until  discontinued         04/08/22 0052            I have assessed these findings virtually using a telemed platform and with assistance of the bedside nurse.        The attending portion of this evaluation, treatment, and documentation was performed per Tracey Wright MD via Telemedicine AudioVisual using the secure Chance (app) software platform with 2 way audio/video. The provider was located off-site and the patient is located in the hospital. The aforementioned video software was utilized to document the relevant history and physical exam    Tracey Wright MD  Department of Hospital Medicine   [facility] Surg

## 2022-04-14 NOTE — ASSESSMENT & PLAN NOTE
- Interval history and physical exam findings as described above  - Likely pyoderma gangrenosum, though given previous fever and soft BP, concern for concurrent cellulitis  - BCx negative  - wound culture with no growth so far  - Continue empiric vanc and change Zosyn to rocephin per ID recs end date of 4/13  - change IV on 4/11 due to infiltration with superficial thrombophlebitis found on US  -elevated Cr after mildly elevated Vanc - discharge once Cr improved; IVF started

## 2022-04-14 NOTE — ASSESSMENT & PLAN NOTE
- S/p total colectomy in 2017  - No current medication regimen  -referral to GI for evaluation for biologic therapy

## 2022-04-14 NOTE — PLAN OF CARE
Patient in bed awake and denied any acute distress, call button and other personal items within reach. Will complete discharge paper, educate pt and family and arrange transportation off the unit. Mother at the bedside for ride home.  Problem: Adult Inpatient Plan of Care  Goal: Plan of Care Review  Outcome: Adequate for Care Transition  Goal: Patient-Specific Goal (Individualized)  Outcome: Adequate for Care Transition  Goal: Absence of Hospital-Acquired Illness or Injury  Outcome: Adequate for Care Transition  Goal: Optimal Comfort and Wellbeing  Outcome: Adequate for Care Transition  Goal: Readiness for Transition of Care  Outcome: Adequate for Care Transition     Problem: Adjustment to Illness (Sepsis/Septic Shock)  Goal: Optimal Coping  Outcome: Adequate for Care Transition     Problem: Bleeding (Sepsis/Septic Shock)  Goal: Absence of Bleeding  Outcome: Adequate for Care Transition     Problem: Glycemic Control Impaired (Sepsis/Septic Shock)  Goal: Blood Glucose Level Within Desired Range  Outcome: Adequate for Care Transition     Problem: Infection Progression (Sepsis/Septic Shock)  Goal: Absence of Infection Signs and Symptoms  Outcome: Adequate for Care Transition     Problem: Nutrition Impaired (Sepsis/Septic Shock)  Goal: Optimal Nutrition Intake  Outcome: Adequate for Care Transition     Problem: Impaired Wound Healing  Goal: Optimal Wound Healing  Outcome: Adequate for Care Transition

## 2022-04-14 NOTE — ASSESSMENT & PLAN NOTE
- Interval history and physical exam findings as described above  - Does appear to have history of less severe ulcerative skin lesions  - Not on any immune-modifying therapy  - Treating for possible overlying cellulitis as below  - Dermatology consulted, appreciate assistance - punch biopsy confirmed  - Wound care consulted - defer therapy to Derm; will just continue vaseline gauze with Kerlix  - PRN analgesics provided  - Continuing to monitor  -Derm recommends prednisone 60 mg daily therapy with GI eval for other alternative therapy to steroids  -glucose monitoring and pantoprazole ordered

## 2022-04-14 NOTE — PLAN OF CARE
Problem: Adult Inpatient Plan of Care  Goal: Plan of Care Review  Outcome: Ongoing, Progressing  Goal: Patient-Specific Goal (Individualized)  Outcome: Ongoing, Progressing  Goal: Absence of Hospital-Acquired Illness or Injury  Outcome: Ongoing, Progressing  Goal: Optimal Comfort and Wellbeing  Outcome: Ongoing, Progressing  Goal: Readiness for Transition of Care  Outcome: Ongoing, Progressing   VSS. No complaints of pain. Call light and personal items within reach, instructed to call for assistance .

## 2022-04-14 NOTE — DISCHARGE SUMMARY
[facility] Medicine  Telemedicine Discharge Summary      Patient Name: Lorelei Rodriguez  MRN: [id number]  Patient Class: IP- Inpatient  Admission Date: 4/7/2022  Hospital Length of Stay: 7 days  Discharge Date and Time:  04/14/2022 9:42 AM  Attending Physician: Nava Roth MD   Discharging Provider: Nava Roth MD  Primary Care Provider: Batool Hinton MD      HPI:   Lorelei Rodriguez is a 26 y.o. female with ulcerative colitis who presented initially to [facility] ED with complaints of right leg wound for the past two weeks.  She reports that the wound initially started as a red, raised lesion for which she had a telemedicine visit with her dermatologist on Mar 22, 2022.  She was prescribed a course of doxycycline with which she was compliant.  Unfortunately, it seemed to worsen and she presented to the ED on Apr 2, 2022 and was prescribed a course of amoxicillin/clavulanate.  She also felt that there was no improvement and since that the lesion started to bleed a couple days ago.  She denies any fevers other than today which was 100.5° F. She denies any trauma to her right lower extremity.  She does volunteer that this lesion looks similar to two lesions on her right forearm that was due to pyoderma gangrenosum.  She has otherwise been in her usual state of health.        * No surgery found *      Hospital Course:   Pt transferred to [facility] and arrived on 4/9. Other than RLE wound, she remains HDS and grossly asymptomatic. Dermatology consulted for assistance for likely pyoderma gangrenosum; continuing coverage with abx for possible superimposed cellulitis.     See problems listed below for additional details.    Goals of Care Treatment Preferences:  Code Status: Full Code      Consults:   Consults (From admission, onward)        Status Ordering Provider     Inpatient consult to Midline team  Once        Provider:  (Not yet assigned)    THONY Marino     Inpatient consult to Infectious  Diseases  Once        Provider:  (Not yet assigned)    Completed BERNADETTE HOUSE     Inpatient virtual consult to Hospital Medicine  Once        Provider:  (Not yet assigned)    Completed BERNADETTE HOUSE     Inpatient consult to Dermatology  Once        Provider:  (Not yet assigned)    Completed ML SWAN     Inpatient consult to Gastroenterology  Once        Provider:  Emmanuel Ace MD    Completed NICOLÁS NEGRETE          * Pyoderma gangrenosum  - Interval history and physical exam findings as described above  - Does appear to have history of less severe ulcerative skin lesions  - Not on any immune-modifying therapy  - Treating for possible overlying cellulitis as below  - Dermatology consulted, appreciate assistance - punch biopsy confirmed  - Wound care consulted - defer therapy to Derm; will just continue vaseline gauze with Kerlix  - PRN analgesics provided  - Continuing to monitor  -Derm recommends prednisone 60 mg daily therapy with GI eval for other alternative therapy to steroids  -glucose monitoring and pantoprazole ordered     Hypokalemia  replete as indicated for goal K+ of 4     Ileostomy status  Patient without any complaints regarding her ileostomy.  She is able to independently provide care.     Class 1 obesity due to excess calories without serious comorbidity in adult  - Body mass index is 32.66 kg/m².  - Pt counseled on dietary and lifestyle modifications in relation to health  - Consider dietary/nutrition consult outpatient     Cellulitis of right leg  - Interval history and physical exam findings as described above  - Likely pyoderma gangrenosum, though given previous fever and soft BP, concern for concurrent cellulitis  - BCx negative  - wound culture with no growth so far  - Continue empiric vanc and change Zosyn to rocephin per ID recs end date of 4/13  - change IV on 4/11 due to infiltration with superficial thrombophlebitis found on US  -elevated Cr after mildly  "elevated Vanc - discharge once Cr improved; IVF started     Ulcerative colitis  - S/p total colectomy in 2017  - No current medication regimen  -referral to GI for evaluation for biologic therapy     Final Active Diagnoses:    Diagnosis Date Noted POA    PRINCIPAL PROBLEM:  Pyoderma gangrenosum [L88] 04/07/2022 Yes    Hypokalemia [E87.6] 04/13/2022 Yes    Ileostomy status [Z93.2] 04/10/2022 Not Applicable    Class 1 obesity due to excess calories without serious comorbidity in adult [E66.09] 04/09/2022 Yes    Cellulitis of right leg [L03.115] 04/07/2022 Yes    Ulcerative colitis [K51.90] 11/28/2018 Yes      Problems Resolved During this Admission:       Discharged Condition: stable    Disposition: Home or Self Care    Follow Up:   Follow-up Information     Batool Hinton MD. Schedule an appointment as soon as possible for a visit in 1 week(s).    Specialty: Internal Medicine  Why: For discharge from hospital follow up  Contact information:  [address] [facility]  [place] [address]  [phone]             [facility] DERMATOLOGY. Go on 4/26/2022.    Specialty: Dermatology  Why: As Scheduled, As previously planned  Contact information:  [address]  [place] [address]  [phone]           [facility] GASTROENTEROLOGY. Schedule an appointment as soon as possible for a visit in 1 week(s).    Specialty: Gastroenterology  Why: For discharge from hospital follow up  Contact information:  [address]  [place] [address]  [phone]                     Future Appointments   Date Time Provider Department Center   4/26/2022  1:00 PM [facility]OLOGY [facility]       Patient Instructions:      WALKER FOR HOME USE     Order Specific Question Answer Comments   Type of Walker: Lb (4'4"-5'6")    With wheels? Yes    Height: 5' 1" (1.549 m)    Weight: 78.4 kg (172 lb 13.5 oz)    Length of need (1-99 months): 99    Does patient have medical equipment at home? none    Please " check all that apply: Patient's condition impairs ambulation.    Please check all that apply: Patient is unable to safely ambulate without equipment.      Ambulatory referral/consult to Gastroenterology   Standing Status: Future   Referral Priority: Urgent Referral Type: Consultation   Referral Reason: Specialty Services Required   Requested Specialty: Gastroenterology   Number of Visits Requested: 1     Ambulatory referral/consult to Outpatient Case Management   Referral Priority: Routine Referral Type: Consultation   Referral Reason: Specialty Services Required   Number of Visits Requested: 1     Diet diabetic   Order Comments: While taking Prednisone.     Notify your health care provider if you experience any of the following:  temperature >100.4     Notify your health care provider if you experience any of the following:  persistent nausea and vomiting or diarrhea     Notify your health care provider if you experience any of the following:  difficulty breathing or increased cough     Notify your health care provider if you experience any of the following:  severe persistent headache     Notify your health care provider if you experience any of the following:  persistent dizziness, light-headedness, or visual disturbances     Notify your health care provider if you experience any of the following:  increased confusion or weakness     Change dressing (specify)   Order Comments: Dressing change: 2 times per day. Cover wound to right leg with vaseline gauze and then kerlix. Change twice daily and as needed.     Activity as tolerated       Significant Diagnostic Studies:   Recent Labs   Lab 04/12/22  0521 04/13/22  0320 04/14/22  0701   WBC 10.68 12.37 16.97*   HGB 12.1 12.2 12.3   HCT 37.0 36.1* 36.0*    321 314     Recent Labs   Lab 04/12/22  0521 04/13/22  0320 04/14/22  0701   GRAN 71.5  7.6 75.7*  9.4* 81.8*  13.9*   LYMPH 12.8*  1.4 9.6*  1.2 8.4*  1.4   MONO 10.7  1.1* 10.3  1.3* 8.5  1.5*    EOS 0.5 0.4 0.0     Recent Labs   Lab 04/12/22  0521 04/13/22  0320 04/13/22  1721 04/14/22  0701    140 139 139   K 3.8 3.3* 3.8 3.8    103 103 105   CO2 29 26 24 22*   BUN 5* 7 8 13   CREATININE 1.8* 1.8* 1.7* 1.4   GLU 84 72 108 94   CALCIUM 9.9 9.5 10.0 9.6   ALBUMIN 2.6* 2.6* 2.5* 2.5*   MG 1.8 1.7  --  1.9   PHOS 4.1 3.6 2.6* 3.8     Recent Labs   Lab 04/09/22  0338 04/10/22  0423 04/12/22  0521 04/13/22  0320 04/14/22  0701   ALKPHOS 83   < > 92 93 78   ALT 14   < > 20 20 16   AST 16   < > 28 25 16   PROT 6.2   < > 7.3 7.3 7.1   BILITOT 0.9   < > 0.8 0.4 0.2   CRP 84.6*  --   --  73.5*  --     < > = values in this interval not displayed.     Recent Labs   Lab 04/08/22  0404 04/09/22  0338   LACTATE 0.9  --    SEDRATE  --  63*     SARS-CoV-2 RNA, Amplification, Qual (no units)   Date Value   11/18/2020 Negative     POC Rapid COVID (no units)   Date Value   04/07/2022 Negative         US Upper Extremity Veins Right  Narrative: EXAMINATION:  US UPPER EXTREMITY VEINS RIGHT    CLINICAL HISTORY:  UE with edema after IV infiltration;    TECHNIQUE:  Duplex and color flow Doppler evaluation and dynamic compression was performed of the right upper extremity veins.    COMPARISON:  None    FINDINGS:  Central veins: The internal jugular, subclavian, and axillary veins are patent and free of thrombus.    Arm veins: The brachial and basilic veins are patent and compressible.  Noncompressible cephalic vein with occlusive thrombus.    Contralateral subclavian/internal jugular veins: The left subclavian and internal jugular veins are patent and free of thrombus.  Impression: Superficial thrombophlebitis with occlusion of the right cephalic vein.    No evidence of deep vein thrombus.    This report was flagged in Epic as abnormal.    Electronically signed by resident: Aneta Mina  Date:    04/11/2022  Time:    14:04    Electronically signed by: Raymundo Silveira MD  Date:    04/11/2022  Time:    14:11        Medications:  Reconciled Home Medications:      Medication List      START taking these medications    HYDROcodone-acetaminophen 5-325 mg per tablet  Commonly known as: NORCO  Take 1 tablet by mouth every 8 (eight) hours as needed for Pain.     pantoprazole 40 MG tablet  Commonly known as: PROTONIX  Take 1 tablet (40 mg total) by mouth once daily.  Start taking on: April 15, 2022     predniSONE 20 MG tablet  Commonly known as: DELTASONE  Take 3 tablets (60 mg total) by mouth once daily. Take this dose until follow up with Dermatology, then as dierected.  Start taking on: April 15, 2022        CONTINUE taking these medications    citalopram 40 MG tablet  Commonly known as: CeleXA  Take 40 mg by mouth once daily.        STOP taking these medications    amoxicillin-clavulanate 875-125mg 875-125 mg per tablet  Commonly known as: AUGMENTIN     mupirocin 2 % ointment  Commonly known as: BACTROBAN            Indwelling Lines/Drains at time of discharge:   Lines/Drains/Airways     Drain  Duration                Colostomy 09/01/18 0800 Other (see comments) RLQ 1321 days                Time spent on the discharge of patient: 50 minutes  This service was provided by Virtual Visit.   Patient was seen and examined on the date of discharge.  Additional time was spent speaking with consultants and case management, reviewing records, and/or discussing the plan of care with patient/family.  The patient location is: [address] [place] [address] A  Admitted 4/7/2022  6:46 PM  Present with the patient at the time of the telemed/virtual assessment: Telepresenter    [facility]:  [facility]10/2022   This document was prepared by chart review and may not directly reflect my personal knowledge of the patient's case, clinical course, or significant events during the hospital stay.    The attending portion of this evaluation, treatment, and documentation was performed per Nava Roth MD via Telemedicine AudioVisual  using the secure Couple software platform with 2 way audio/video. The provider was located off-site and the patient is located in the hospital. The aforementioned video software was utilized to document the relevant history and physical exam.    Nava Roth MD  Department of [facility] Medicine  [facility] Surg

## 2022-04-15 LAB
FINAL PATHOLOGIC DIAGNOSIS: NORMAL
GROSS: NORMAL
Lab: NORMAL
MICROSCOPIC EXAM: NORMAL

## 2022-04-18 ENCOUNTER — SSC ENCOUNTER (OUTPATIENT)
Dept: ADMINISTRATIVE | Facility: OTHER | Age: 27
End: 2022-04-18
Payer: COMMERCIAL

## 2022-04-18 LAB — BACTERIA SPEC ANAEROBE CULT: NORMAL

## 2022-04-18 NOTE — PROGRESS NOTES
Please note the following patient's information was forwarded to [employer] for case management and/or .    Please contact Ext. [phone] with any questions.    Thank you,    Fatou Gordon, [facility]  [place] Mgmnt  [phone]

## 2022-04-26 ENCOUNTER — OFFICE VISIT (OUTPATIENT)
Dept: DERMATOLOGY | Facility: CLINIC | Age: 27
End: 2022-04-26
Payer: COMMERCIAL

## 2022-04-26 DIAGNOSIS — D22.9 MULTIPLE NEVI: ICD-10-CM

## 2022-04-26 DIAGNOSIS — L88 PYODERMA GANGRENOSUM: Primary | ICD-10-CM

## 2022-04-26 DIAGNOSIS — L73.9 FOLLICULITIS: ICD-10-CM

## 2022-04-26 PROCEDURE — 99214 OFFICE O/P EST MOD 30 MIN: CPT | Mod: S$GLB,,, | Performed by: STUDENT IN AN ORGANIZED HEALTH CARE EDUCATION/TRAINING PROGRAM

## 2022-04-26 PROCEDURE — 1111F DSCHRG MED/CURRENT MED MERGE: CPT | Mod: CPTII,S$GLB,, | Performed by: STUDENT IN AN ORGANIZED HEALTH CARE EDUCATION/TRAINING PROGRAM

## 2022-04-26 PROCEDURE — 99214 PR OFFICE/OUTPT VISIT, EST, LEVL IV, 30-39 MIN: ICD-10-PCS | Mod: S$GLB,,, | Performed by: STUDENT IN AN ORGANIZED HEALTH CARE EDUCATION/TRAINING PROGRAM

## 2022-04-26 PROCEDURE — 1111F PR DISCHARGE MEDS RECONCILED W/ CURRENT OUTPATIENT MED LIST: ICD-10-PCS | Mod: CPTII,S$GLB,, | Performed by: STUDENT IN AN ORGANIZED HEALTH CARE EDUCATION/TRAINING PROGRAM

## 2022-04-26 PROCEDURE — 99999 PR PBB SHADOW E&M-EST. PATIENT-LVL III: CPT | Mod: PBBFAC,,,

## 2022-04-26 PROCEDURE — 99999 PR PBB SHADOW E&M-EST. PATIENT-LVL III: ICD-10-PCS | Mod: PBBFAC,,,

## 2022-04-26 RX ORDER — PREDNISONE 20 MG/1
TABLET ORAL
Qty: 60 TABLET | Refills: 0 | Status: SHIPPED | OUTPATIENT
Start: 2022-04-26 | End: 2022-05-26

## 2022-04-26 NOTE — PROGRESS NOTES
Subjective:       Patient ID:  Lorelei Rodriguez is a 26 y.o. female who presents for   Chief Complaint   Patient presents with    Spot     Ms. Rodriguez is a 27yo F with a hx of UC s/ total colectomy and ilestomy placement (not currently on systemic therapy) and PG who presents for post-hospital follow up. She was admitted in mid-April 2022 for concern for cellulitis surrounding a PG lesion on the right anterior shin. The lesion on the shin was biopsied and showed pyoderma gangrenosum. She was subsequently started on prednisone 60mg daily and states she has been on this for about 1 week. She reports the lesion is improving and is much improved from her hospitalization. She denies any pain in the area. She had previously been on Remicade in the past for her UC (after the colectomy) but never had done an injectable medication. She also reports some pus bumps on her chest that appeared a few days ago.    4/9/22 Skin biopsy report:  The following addendum is to issue the results of the special stains. After   revisiting report with Dr. Mclean on 4/12/2022  in the comments section it   reads pyogenic granuloma when it should read pyoderma gangrenosum. This   report has been issued as an addenum as a result.   *Revised Report*  Skin, right lower extremity, punch biopsy:   -NEUTROPHILIC DERMATOSIS, consistent with   Comment:  These findings can is seen pyoderma gangrenosum as given in the   differential diagnosis.  They also can be seen in  sweet's syndrome,   bowel-bypass syndrome and rheumatoid neutrophilic dermatitis in the correct   clinical context.  Special stains for infectious organisms are pending the   results of the special stains will be issued in an addendum.     Spot        Review of Systems   Skin: Positive for rash.        Objective:    Physical Exam   Constitutional: She appears well-developed and well-nourished.   Neurological: She is  alert and oriented to person, place, and time.   Skin:                 Diagram Legend     Erythematous scaling macule/papule c/w actinic keratosis       Vascular papule c/w angioma      Pigmented verrucoid papule/plaque c/w seborrheic keratosis      Yellow umbilicated papule c/w sebaceous hyperplasia      Irregularly shaped tan macule c/w lentigo     1-2 mm smooth white papules consistent with Milia      Movable subcutaneous cyst with punctum c/w epidermal inclusion cyst      Subcutaneous movable cyst c/w pilar cyst      Firm pink to brown papule c/w dermatofibroma      Pedunculated fleshy papule(s) c/w skin tag(s)      Evenly pigmented macule c/w junctional nevus     Mildly variegated pigmented, slightly irregular-bordered macule c/w mildly atypical nevus      Flesh colored to evenly pigmented papule c/w intradermal nevus       Pink pearly papule/plaque c/w basal cell carcinoma      Erythematous hyperkeratotic cursted plaque c/w SCC      Surgical scar with no sign of skin cancer recurrence      Open and closed comedones      Inflammatory papules and pustules      Verrucoid papule consistent consistent with wart     Erythematous eczematous patches and plaques     Dystrophic onycholytic nail with subungual debris c/w onychomycosis     Umbilicated papule    Erythematous-base heme-crusted tan verrucoid plaque consistent with inflamed seborrheic keratosis     Erythematous Silvery Scaling Plaque c/w Psoriasis     See annotation            Assessment / Plan:        Pyoderma gangrenosum  -biopsy proven PG with a hx of UC s/p total colectomy and ileostomy placement  -was previously in the hospital for PG with concern for overlying infection  -currently on prednisone 60mg and PG lesion on right anterior shin is improving significantly with no pain per the patient  -will start to taper prednisone today: start 50mg X 10 days, then 40mg X 10 days, then 30mg X 10 days  -discussed with patient that prednisone is not a long term  medication to treat PG as it has many side effects  -discussed other treatment options including topicals and oral medications such as Dapsone or pentoxifylline  -continue topical clobetasol and Vaseline impregnated gauze with bandage to the wound  -counseled patient to call GI to set up an appointment (referral already placed) to discuss starting Humira for her UC as this will also benefit her PG lesion    Folliculitis  -folliculitis to the chest, possibly secondary to steroid use  -start OTC benzoyl peroxide wash  -counseled patient on how to use    Multiple nevi on back  -multiple dark brown nevi on the back that have been there for years  -lesions are unchanging  -appear ok today  -will continue to monitor           Follow up in about 4 weeks (around 5/24/2022).

## 2022-04-29 ENCOUNTER — TELEPHONE (OUTPATIENT)
Dept: GASTROENTEROLOGY | Facility: CLINIC | Age: 27
End: 2022-04-29
Payer: COMMERCIAL

## 2022-04-29 NOTE — TELEPHONE ENCOUNTER
Spoke to pt regarding message below.   Pt prefer to be seen by female provider.   I scheduled pt to be seen by Earl on 6/23 at 3:30 pm [facility] [place]. Pt verbalize understanding,  Confirmed appt and thank me     ----- Message from Star Vitale sent at 4/29/2022 11:10 AM CDT -----  Type:  Sooner Apoointment Request    Caller is requesting a sooner appointment.  Caller declined first available appointment listed below.  Caller will not accept being placed on the waitlist and is requesting a message be sent to doctor.  Name of Caller:Lorelei Rodriguez    When is the first available appointment?no available appointment     Symptoms:- Pyoderma gangrenosum  K51.918 (ICD-10-CM) - Ulcerative colitis with other complication, unspecified location    Would the patient rather a call back or a response via [facility]? Call back     Best Call Back Number:(frbrcb). [phone]    Additional Information:Referring Provider: THONY HENRY  Patient maryana like a female Dr if possible

## 2022-05-02 ENCOUNTER — TELEPHONE (OUTPATIENT)
Dept: GASTROENTEROLOGY | Facility: CLINIC | Age: 27
End: 2022-05-02
Payer: COMMERCIAL

## 2022-05-02 NOTE — TELEPHONE ENCOUNTER
Spoke to patient and advised that [facility] has not sent records to us after 3 requests.  She is going to contact them and have them faxed to us at [phone].

## 2022-05-09 LAB — FUNGUS SPEC CULT: NORMAL

## 2022-05-20 ENCOUNTER — OFFICE VISIT (OUTPATIENT)
Dept: GASTROENTEROLOGY | Facility: CLINIC | Age: 27
End: 2022-05-20
Payer: COMMERCIAL

## 2022-05-20 VITALS
TEMPERATURE: 98 F | OXYGEN SATURATION: 99 % | DIASTOLIC BLOOD PRESSURE: 79 MMHG | HEART RATE: 105 BPM | BODY MASS INDEX: 33.12 KG/M2 | WEIGHT: 175.25 LBS | SYSTOLIC BLOOD PRESSURE: 116 MMHG

## 2022-05-20 DIAGNOSIS — L88 PYODERMA GANGRENOSUM: Primary | ICD-10-CM

## 2022-05-20 DIAGNOSIS — K51.018 ULCERATIVE PANCOLITIS WITH OTHER COMPLICATION: ICD-10-CM

## 2022-05-20 PROCEDURE — 99214 PR OFFICE/OUTPT VISIT, EST, LEVL IV, 30-39 MIN: ICD-10-PCS | Mod: S$GLB,,, | Performed by: INTERNAL MEDICINE

## 2022-05-20 PROCEDURE — 1160F RVW MEDS BY RX/DR IN RCRD: CPT | Mod: CPTII,S$GLB,, | Performed by: INTERNAL MEDICINE

## 2022-05-20 PROCEDURE — 1159F MED LIST DOCD IN RCRD: CPT | Mod: CPTII,S$GLB,, | Performed by: INTERNAL MEDICINE

## 2022-05-20 PROCEDURE — 3074F SYST BP LT 130 MM HG: CPT | Mod: CPTII,S$GLB,, | Performed by: INTERNAL MEDICINE

## 2022-05-20 PROCEDURE — 3078F PR MOST RECENT DIASTOLIC BLOOD PRESSURE < 80 MM HG: ICD-10-PCS | Mod: CPTII,S$GLB,, | Performed by: INTERNAL MEDICINE

## 2022-05-20 PROCEDURE — 3074F PR MOST RECENT SYSTOLIC BLOOD PRESSURE < 130 MM HG: ICD-10-PCS | Mod: CPTII,S$GLB,, | Performed by: INTERNAL MEDICINE

## 2022-05-20 PROCEDURE — 3078F DIAST BP <80 MM HG: CPT | Mod: CPTII,S$GLB,, | Performed by: INTERNAL MEDICINE

## 2022-05-20 PROCEDURE — 3008F BODY MASS INDEX DOCD: CPT | Mod: CPTII,S$GLB,, | Performed by: INTERNAL MEDICINE

## 2022-05-20 PROCEDURE — 3008F PR BODY MASS INDEX (BMI) DOCUMENTED: ICD-10-PCS | Mod: CPTII,S$GLB,, | Performed by: INTERNAL MEDICINE

## 2022-05-20 PROCEDURE — 1160F PR REVIEW ALL MEDS BY PRESCRIBER/CLIN PHARMACIST DOCUMENTED: ICD-10-PCS | Mod: CPTII,S$GLB,, | Performed by: INTERNAL MEDICINE

## 2022-05-20 PROCEDURE — 1159F PR MEDICATION LIST DOCUMENTED IN MEDICAL RECORD: ICD-10-PCS | Mod: CPTII,S$GLB,, | Performed by: INTERNAL MEDICINE

## 2022-05-20 PROCEDURE — 99214 OFFICE O/P EST MOD 30 MIN: CPT | Mod: S$GLB,,, | Performed by: INTERNAL MEDICINE

## 2022-05-20 NOTE — PROGRESS NOTES
[facility]          Inflammatory Bowel Disease New Patient Consultation Note         TODAY'S VISIT DATE:  5/20/2022    Reason for Consult:    Chief Complaint   Patient presents with    Ulcerative Colitis       PCP: Batool Hinton      Referring MD:   Dr. Hanna Padilla    History of Present Illness:  Lorelei Rodriguez who is a 26 y.o. female is being seen today at the [facility] on 05/20/2022 for inflammatory bowel disease- ulcerative colitis.  History is noted below.  She has a history of total colectomy with end ileostomy and Xavier's pouch that was done in 2017. She has a history of pyoderma that started around 2018 and was doing quite well until earlier this year when she began having an increasing amount of skin lesions.  She is currently on prednisone.  This has helped improve her skin lesions.  She was sent here by her dermatologist to discuss possibly starting Humira for the pyoderma.  She reports no issues with her rectal stump.  She denies any discharge or output from the rectum.  There is no blood in the stools.  Her ileostomy output is normal and has been stable since the time of surgery.    IBD History:  In September of 2017 she presented to an outside facility with diarrhea and hematochezia.  A CT scan was performed that revealed wall thickening of the descending colon extending from the splenic flexure down to the sigmoid colon.  The transverse colon was normal appearing.  There appeared to be some mild wall thickening of the sigmoid colon as well.  A colonoscopy was done and revealed diffuse erythema, ulceration, and exudate extending from the rectum to the splenic flexure.  The terminal ileum was normal appearing as was the right colon.  Biopsies were negative for CMV but did reveal chronic inflammatory changes of the cecum, right colon, left colon, and rectum.  An IBD serology was performed that showed a pattern consistent with  Crohn's disease.  She received at least one dose of IFX as an outpatient but thinks she might have received a dose while initially admitted to the hospital as well. She developed severe itching after outpatient infusion so this was stopped. October 24, 2017 she was taken to the operating room because of colonic perforation.  Perforation was noted to be in the cecum.  She underwent a total colectomy with end ileostomy.  Pathology revealed findings consistent with chronic active inflammatory bowel disease with perforation and acute peritonitis.  The terminal ileum did not show any inflammatory changes.  In February of 2018 she had biopsies performed multiple skin lesions that revealed ulcerated necrotic tissue with hemorrhagic suppurative inflammation.  It appears that she was diagnosed with pyoderma gangrenosum at that time.  I do not have any records in till February of 2020 when she presented with vomiting and a CT scan showed findings consistent with a near total colectomy but the remaining sigmoid and and rectum were concerning for possible colitis.  There was also some mesenteric lymphadenopathy noted.  In November of 2020 she underwent a sigmoidoscopy the revealed granularity, friability, and erythema with contact bleeding in the rectum and rectosigmoid junction.  I do not have the biopsies but it appears that she was started on Rowasa enemas at that time.  A repeat sigmoidoscopy done in February of 2021 showed similar findings.  That same month she saw Dr. Johnson to discuss possible ileostomy reversal.  A CT enterography was performed that showed some inflammatory changes of the rectum/rectosigmoid colon but no small bowel issues.  It was recommended at that visit that she aim to lose some weight to make her a better candidate for surgical intervention.  She was recently seen in the hospital with skin issues and was diagnosed with pyoderma gangrenosum.  She was referred here for further management of her  inflammatory bowel disease.    IBD Details:  Dx Date:  2017  Disease type/distribution:  Ulcerative colitis/pancolitis  Current Treatment:  None currently  Start Date:  Response:   Optimized:   Adverse reactions:   Prior surgeries:  Total colectomy with end ileostomy and Xavier's pouch  CRP Elevation:  Unknown   calprotectin:  Unknown  Disease Complications:  Colonic perforation  Extraintestinal manifestations:  Pyoderma gangrenosum  Prior treatments:   Steroids:  Good response a pyoderma, inadequate response for ulcerative colitis  5ASA:  None  IMM:  None  TNF Inh:  Previously received 2 infusions of infliximab-significant itching with 2nd infusion   Anti-Integrin:  None   IL 12/23:  None  MAXX Inh:  None  S1P Modulator: None    Previous Clinical Trials:  None    Last Colonoscopy:   2017-pan colitis     November 2020-sigmoidoscopy with inflammatory changes in the rectum and rectosigmoid.    Other Endoscopies:  None    Imaging:   MRE:  None   CT:  CT enterography in February 2021 with inflammatory changes of the rectum and sigmoid but no small bowel lesions   Other:  None    Pertinent Labs:  Lab Results   Component Value Date    SEDRATE 63 (H) 04/09/2022    CRP 73.5 (H) 04/13/2022     No results found for: TTGIGA, IGA  No results found for: TSH, FREET4  No results found for: RXQUHQPD34PO, YHCEKEEG59  Lab Results   Component Value Date    HEPBSAG Negative 04/08/2022    HEPBCAB Negative 04/08/2022    HEPCAB Negative 04/08/2022     No results found for: WFL25FWCJ  Lab Results   Component Value Date    NIL 0.65128 04/08/2022    MITOGENNIL 6.004 04/08/2022     No results found for: TPTMINTERP, TPMTRESULT  No results found for: STOOLCULTURE, UFFHLCOQVT1I, LBGGLJAGTG6Z, CDIFFICILEAN, CDIFFTOX, CDIFFICILEBY  No results found for: CALPROTECTIN    Therapeutic Drug Monitoring Labs:  No results found for: PROMETH  No results found for: ANSADAINIT, INFLIXIMAB, INFLIXINTERP    Vaccinations:  Lab Results   Component Value Date     HEPBSAB Positive 04/08/2022     Lab Results   Component Value Date    HEPAIGG Negative 04/08/2022     No results found for: VARICELLAZOS, VARICELLAINT  Immunization History   Administered Date(s) Administered    COVID-19, MRNA, LN-S, PF (Pfizer) (Purple Cap) 07/17/2021, 08/07/2021    DTaP 1995, 1995, 1995, 08/20/1996, 06/04/1999    HIB 1995, 1995, 1995, 08/20/1996    HPV Quadrivalent 08/13/2007, 10/16/2007, 02/29/2008    Hepatitis B, Pediatric/Adolescent 1995, 1995, 1995    IPV 1995, 1995, 1995, 08/20/1996, 06/04/1999    MMR 06/04/1996, 06/04/1999    Meningococcal Conjugate (MCV4P) 01/02/2007    Td (ADULT) 01/02/2007    Tdap 09/16/2011    Varicella 06/04/1996, 10/16/2007         Review of Systems  Review of Systems   Constitutional: Negative for chills, fever and weight loss.   HENT: Negative for sore throat.    Eyes: Negative for pain, discharge and redness.   Respiratory: Negative for cough, shortness of breath and wheezing.    Cardiovascular: Negative for chest pain, orthopnea and leg swelling.   Gastrointestinal: Negative for abdominal pain, blood in stool, constipation, diarrhea, heartburn, melena, nausea and vomiting.   Genitourinary: Negative for dysuria, frequency and urgency.   Musculoskeletal: Negative for back pain, joint pain and myalgias.   Skin: Negative for itching and rash.   Neurological: Negative for focal weakness and seizures.   Endo/Heme/Allergies: Does not bruise/bleed easily.   Psychiatric/Behavioral: Negative for depression. The patient is not nervous/anxious.        Medical History:   Past Medical History:   Diagnosis Date    Pyoderma gangrenosa     UC (ulcerative colitis)        Surgical History:  Past Surgical History:   Procedure Laterality Date    ILEOSTOMY      SINUS SURGERY         Family History:   History reviewed. No pertinent family history.    Social History:   Social History     Tobacco Use     Smoking status: Never Smoker    Smokeless tobacco: Never Used   Substance Use Topics    Alcohol use: Yes     Comment: RARELY    Drug use: Never       Allergies: Reviewed    Home Medications:   Medication List with Changes/Refills   Current Medications    CITALOPRAM (CELEXA) 40 MG TABLET    Take 40 mg by mouth once daily.    HYDROCODONE-ACETAMINOPHEN (NORCO) 5-325 MG PER TABLET    Take 1 tablet by mouth every 8 (eight) hours as needed for Pain.    PANTOPRAZOLE (PROTONIX) 40 MG TABLET    Take 1 tablet (40 mg total) by mouth once daily.    PREDNISONE (DELTASONE) 20 MG TABLET    Take 2.5 tablets (50 mg total) by mouth once daily for 10 days, THEN 2 tablets (40 mg total) once daily for 10 days, THEN 1.5 tablets (30 mg total) once daily for 10 days.       Physical Exam:  Vital Signs:  /79   Pulse 105   Temp 98.2 °F (36.8 °C)   Wt 79.5 kg (175 lb 4.3 oz)   SpO2 99%   BMI 33.12 kg/m²   Body mass index is 33.12 kg/m².    Physical Exam  Vitals and nursing note reviewed.   Constitutional:       General: She is not in acute distress.     Appearance: Normal appearance. She is well-developed. She is not ill-appearing or toxic-appearing.   Eyes:      Conjunctiva/sclera: Conjunctivae normal.      Pupils: Pupils are equal, round, and reactive to light.   Neck:      Thyroid: No thyromegaly.   Cardiovascular:      Rate and Rhythm: Normal rate and regular rhythm.      Heart sounds: Normal heart sounds. No murmur heard.  Pulmonary:      Effort: Pulmonary effort is normal.      Breath sounds: Normal breath sounds. No wheezing or rales.   Abdominal:      General: Bowel sounds are normal. There is no distension.      Palpations: Abdomen is soft. There is no mass.      Tenderness: There is no abdominal tenderness.      Comments: Healed abdominal incisions, ileostomy in the right lower quadrant   Musculoskeletal:         General: No tenderness. Normal range of motion.   Lymphadenopathy:      Cervical: No cervical adenopathy.    Skin:     Findings: No erythema or rash.   Neurological:      General: No focal deficit present.      Mental Status: She is alert and oriented to person, place, and time.   Psychiatric:         Mood and Affect: Mood normal.         Behavior: Behavior normal.         Thought Content: Thought content normal.         Judgment: Judgment normal.         Labs: reviewed and pertinent noted above    Assessment/Plan:  Lorelei Rodriguez is a 26 y.o. female with ulcerative colitis status post total colectomy with end ileostomy and Xavier's pouch now having issues with pyoderma gangrenosum. The following issues were addresssed:    1. Pyoderma gangrenosum    2. Ulcerative pancolitis with other complication      1. Ulcerative colitis:  We had a long discussion today regarding appropriate management of her colitis in the setting of ongoing issues with pyoderma.  We discussed the fact that while ulcerative colitis and pyoderma do frequently accompanied each other the correlation between active pyoderma an active ulcerative colitis is variable and in 50% of the cases colon inflammation can drive active pyoderma issues.  Currently she is doing well on prednisone but this is not a good long-term option for her.  Ultimately, which she would likely benefit from would be proctectomy and J pouch placement as this may resolve the pyoderma issues with removal of the remainder of the inflamed large bowel.  I discussed this with Dr. Johnson this afternoon and I will also discussed with her dermatologist in the near future.  We will likely need to work on getting her off of the steroids to allow for further weight loss and improved healing after surgery but I am concerned that if we decrease the steroids further at this time she may run into more problems with the pyoderma.  Humira might be a good bridge in the short term to help get her off of the prednisone and then we can proceed with surgical management.  It is possible that after surgery she  may not need any further treatment for the pyoderma.    2. Pyoderma gangrenosum:  See above.       # IBD specific health maintenance:  Colon cancer surveillance:  Not applicable    Annual:  - Eye exam:  Not applicable  - Skin exam (if on IMM/TNF):  Up-to-date  - reminded pt to use sunblock/hats/sunprotective clothing  - PAP (if immunosuppressed):  Discuss in the future    DEXA:  Not applicable    Vitamin D:  Review in the future    Vaccines:    Review in the future    Follow up:  Will arrange after plan established      Thank you again for sending Lorelei Rodriguez to see Dr. Estuardo Cherry today at the [facility]. Please don't hesitate to contact Dr. Cherry if there are any questions regarding this evaluation, or if you have any other patients with inflammatory bowel disease for whom you would like a consultation. You can reach Dr. Cherry at [phone] or by email at [email or web address]    Bebeto Cherry MD  Department of Gastroenterology  Inflammatory Bowel Disease

## 2022-05-24 ENCOUNTER — OFFICE VISIT (OUTPATIENT)
Dept: DERMATOLOGY | Facility: CLINIC | Age: 27
End: 2022-05-24
Payer: COMMERCIAL

## 2022-05-24 DIAGNOSIS — Z79.52 LONG TERM (CURRENT) USE OF SYSTEMIC STEROIDS: ICD-10-CM

## 2022-05-24 DIAGNOSIS — L88 PYODERMA GANGRENOSUM: Primary | ICD-10-CM

## 2022-05-24 PROCEDURE — 99999 PR PBB SHADOW E&M-EST. PATIENT-LVL II: ICD-10-PCS | Mod: PBBFAC,,,

## 2022-05-24 PROCEDURE — 99999 PR PBB SHADOW E&M-EST. PATIENT-LVL II: CPT | Mod: PBBFAC,,,

## 2022-05-24 PROCEDURE — 99214 PR OFFICE/OUTPT VISIT, EST, LEVL IV, 30-39 MIN: ICD-10-PCS | Mod: S$GLB,,, | Performed by: STUDENT IN AN ORGANIZED HEALTH CARE EDUCATION/TRAINING PROGRAM

## 2022-05-24 PROCEDURE — 99214 OFFICE O/P EST MOD 30 MIN: CPT | Mod: S$GLB,,, | Performed by: STUDENT IN AN ORGANIZED HEALTH CARE EDUCATION/TRAINING PROGRAM

## 2022-05-24 RX ORDER — PREDNISONE 5 MG/1
TABLET ORAL
Qty: 90 TABLET | Refills: 0 | Status: SHIPPED | OUTPATIENT
Start: 2022-05-24 | End: 2022-06-23

## 2022-05-24 NOTE — PROGRESS NOTES
Subjective:       Patient ID:  Lorelei Rodriguez is a 26 y.o. female who presents for   No chief complaint on file.    Ms. Rodriguez is a 27yo F with a hx of UC s/ total colectomy and ilestomy placement (not currently on systemic therapy) and PG who presents for follow up; LoV about 1 month ago where patient was placed on a prednisone taper and to continue topical clobetasol to the area. She recently saw GI who wanted to discuss with Dermatology and Surgery for further therapeutic care and that likely PG would resolve following surgery. Today, patient reports improvement still. She does note some active drainage about 1 week ago that has improved. Still using topical clobetasol. Has 5 days left of 30mg prednisone. No pain today    Previous History on chart review: She was admitted in mid-April 2022 for concern for cellulitis surrounding a PG lesion on the right anterior shin. The lesion on the shin was biopsied and showed pyoderma gangrenosum. She was subsequently started on prednisone 60mg daily. She had previously been on Remicade in the past for her UC (after the colectomy) but never had done an injectable medication.    4/9/22 Skin biopsy report:  The following addendum is to issue the results of the special stains. After   revisiting report with Dr. Mclean on 4/12/2022  in the comments section it   reads pyogenic granuloma when it should read pyoderma gangrenosum. This   report has been issued as an addenum as a result.   *Revised Report*  Skin, right lower extremity, punch biopsy:   -NEUTROPHILIC DERMATOSIS, consistent with   Comment:  These findings can is seen pyoderma gangrenosum as given in the   differential diagnosis.  They also can be seen in  sweet's syndrome,   bowel-bypass syndrome and rheumatoid neutrophilic dermatitis in the correct   clinical context.  Special stains for infectious organisms are pending the   results of the special  stains will be issued in an addendum.       Review of Systems   All other systems reviewed and are negative.       Objective:    Physical Exam   Constitutional: She appears well-developed and well-nourished.   Neurological: She is alert and oriented to person, place, and time.   Skin:                 Diagram Legend     Erythematous scaling macule/papule c/w actinic keratosis       Vascular papule c/w angioma      Pigmented verrucoid papule/plaque c/w seborrheic keratosis      Yellow umbilicated papule c/w sebaceous hyperplasia      Irregularly shaped tan macule c/w lentigo     1-2 mm smooth white papules consistent with Milia      Movable subcutaneous cyst with punctum c/w epidermal inclusion cyst      Subcutaneous movable cyst c/w pilar cyst      Firm pink to brown papule c/w dermatofibroma      Pedunculated fleshy papule(s) c/w skin tag(s)      Evenly pigmented macule c/w junctional nevus     Mildly variegated pigmented, slightly irregular-bordered macule c/w mildly atypical nevus      Flesh colored to evenly pigmented papule c/w intradermal nevus       Pink pearly papule/plaque c/w basal cell carcinoma      Erythematous hyperkeratotic cursted plaque c/w SCC      Surgical scar with no sign of skin cancer recurrence      Open and closed comedones      Inflammatory papules and pustules      Verrucoid papule consistent consistent with wart     Erythematous eczematous patches and plaques     Dystrophic onycholytic nail with subungual debris c/w onychomycosis     Umbilicated papule    Erythematous-base heme-crusted tan verrucoid plaque consistent with inflamed seborrheic keratosis     Erythematous Silvery Scaling Plaque c/w Psoriasis     See annotation      Assessment / Plan:        Pyoderma gangrenosum  -biopsy proven PG with a hx of UC s/p total colectomy and ileostomy placement  -was previously in the hospital for PG with concern for overlying infection  -previously on prednisone 60mg and now completing prednisone  taper (50mg X 10 days, then 40mg X 10 days, then 30mg X 10 days)  -will continue to taper down prednisone, will start 20mg x10 days after completing 30mg and then 15mg [id number] and then 10mg x10 days  -pending GI starting Humira, will continue to taper more rapidly   - if Humira approved, will rapidly taper prednisone  -continue topical clobetasol and Vaseline impregnated gauze with bandage to the wound  -continue fu with GI and colorectal surgery    RTC 1 month

## 2022-05-28 LAB
ACID FAST MOD KINY STN SPEC: NORMAL
MYCOBACTERIUM SPEC QL CULT: NORMAL

## 2022-05-30 ENCOUNTER — PATIENT MESSAGE (OUTPATIENT)
Dept: GASTROENTEROLOGY | Facility: CLINIC | Age: 27
End: 2022-05-30
Payer: COMMERCIAL

## 2022-05-30 DIAGNOSIS — K51.018 ULCERATIVE PANCOLITIS WITH OTHER COMPLICATION: Primary | ICD-10-CM

## 2022-06-08 RX ORDER — ADALIMUMAB 80MG/0.8ML
80 KIT SUBCUTANEOUS SEE ADMIN INSTRUCTIONS
Qty: 3 PEN | Refills: 0 | Status: SHIPPED | OUTPATIENT
Start: 2022-06-08 | End: 2022-07-21

## 2022-06-08 NOTE — TELEPHONE ENCOUNTER
Pt returned call. Has no further questions.     ----- Message -----   From: Shannan Barba   Sent: 6/8/2022  11:56 AM CDT   To: Dilip BAUMANN Staff   Subject: Return call                                         Type:  Patient Returning Call     Who Called: ESTEPHANIA MOHAN [[id number]]     Who Left Message for Patient:  Carline Flowers     Does the patient know what this is regarding?: Y     Can the clinic reply in [facility]: No     Best Call Back Number:[phone]     Additional Information: She does not have further questions

## 2022-06-15 ENCOUNTER — SPECIALTY PHARMACY (OUTPATIENT)
Dept: PHARMACY | Facility: CLINIC | Age: 27
End: 2022-06-15
Payer: COMMERCIAL

## 2022-06-15 NOTE — TELEPHONE ENCOUNTER
Humira rx received   PA line- [phone]  Test claim copay: $1622.24 for LD after 1200 coupon from [facility]     PA approved from 6/15/22 to 12/31/22.   Called Jacquelin POWER line- Rep: Viv     Benefits Investigation     Insurance name: [employer]  Rep name: Vanessa     Copay amount: $1622.24   Deductible: $0   Coinsurance is 25% of the medication cost.   OOPmax: $6000 indiv, without this claim has paid 3177.76 and has 2822.22 left.   OOP family is $12,000, paid 3177.76, have $8,822.24 to go.   OSP in network? Y  Tier level (if applicable): 9, copay field 4    Ruben, this is Ignacia Walters with [facility].  We are working on your prescription that your doctor has sent us. We will be working with your insurance to get this approved for you. We will be calling you along the way with updates on your medication.  If you have any questions, you can reach us at [phone].    Welcome call outcome: Patient/caregiver reached; she consents to being enrolled in a copay card.   Forwarding to FA.      Called [employer] Assist program-   Rep: Sterling WONG  Enrolled in copay card and Humira copay debit card for remaining copay has been added to profile. Providing the copay debit card will result in a $0 patient out of pocket cost.   Received debit card info for 1x patient assistance.     Patient informed via secure message.

## 2022-06-16 ENCOUNTER — PATIENT MESSAGE (OUTPATIENT)
Dept: PHARMACY | Facility: CLINIC | Age: 27
End: 2022-06-16
Payer: COMMERCIAL

## 2022-06-20 ENCOUNTER — SPECIALTY PHARMACY (OUTPATIENT)
Dept: PHARMACY | Facility: CLINIC | Age: 27
End: 2022-06-20
Payer: COMMERCIAL

## 2022-06-20 DIAGNOSIS — K51.018 ULCERATIVE (CHRONIC) PANCOLITIS WITH OTHER COMPLICATION: Primary | ICD-10-CM

## 2022-06-20 NOTE — TELEPHONE ENCOUNTER
Specialty Pharmacy - Initial Clinical Assessment    Specialty Medication Orders Linked to Encounter    Flowsheet Row Most Recent Value   Medication #1 adalimumab (HUMIRA,CF, PEN CROHNS-UC-HS) 80 mg/0.8 mL PnKt (Order#[id number], Rx#[id number])        Patient Diagnosis   K51.90 - Ulcerative colitis  L88 - Pyoderma gangrenosum    Cuong Rodriguez is a 27 y.o. female, who is followed by the specialty pharmacy service for management and education.    Recent Encounters     Date Type Provider Description    06/20/2022 Specialty Pharmacy Janneth Ventura Initial Clinical Assessment    06/15/2022 Specialty Pharmacy Ignacia Walters, Janneth Referral Authorization        Clinical call attempts since last clinical assessment   No call attempts found.     Current Outpatient Medications   Medication Sig    adalimumab (HUMIRA,CF, PEN CROHNS-UC-HS) 80 mg/0.8 mL PnKt Inject two of the 80mg pens (total 160mg) then 2 weeks later inject one 80mg pen under the skin.    adalimumab 40 mg/0.4 mL PnKt Inject 0.4 mLs (40 mg total) into the skin every 14 (fourteen) days.    citalopram (CELEXA) 40 MG tablet Take 40 mg by mouth once daily.    pantoprazole (PROTONIX) 40 MG tablet Take 1 tablet (40 mg total) by mouth once daily.    predniSONE (DELTASONE) 5 MG tablet Take 4 tablets (20 mg total) by mouth once daily for 10 days, THEN 3 tablets (15 mg total) once daily for 10 days, THEN 2 tablets (10 mg total) once daily for 10 days.   Last reviewed on 6/20/2022  4:04 PM by Lorenzo Aguirre, PharmD    Review of patient's allergies indicates:   Allergen Reactions    Nickel sutures [surgical stainless steel]     Nickel Rash   Last reviewed on  6/20/2022 4:04 PM by Lorenzo Aguirre    Drug Interactions    Drug interactions evaluated: yes  Clinically relevant drug interactions identified: no  Provided the patient with educational material regarding drug interactions: not applicable           Assessment Questions - Documented Responses    Flowsheet  "Row Most Recent Value   Assessment    Medication Reconciliation completed for patient Yes   During the past 4 weeks, has patient missed any activities due to condition or medication? No   During the past 4 weeks, did patient have any of the following urgent care visits? None   Goals of Therapy Status Discussed (new start)   Status of the patients ability to self-administer: Is Able   All education points have been covered with patient? Yes, supplemental printed education provided   Welcome packet contents reviewed and discussed with patient? Yes   Assesment completed? Yes   Plan Therapy being initiated   Do you need to open a clinical intervention (i-vent)? No   Do you want to schedule first shipment? Yes        Refill Questions - Documented Responses    Flowsheet Row Most Recent Value   Patient Availability and HIPAA Verification    Does patient want to proceed with activity? Yes   HIPAA/medical authority confirmed? Yes   Relationship to patient of person spoken to? Self   Refill Screening Questions    When does the patient need to receive the medication? 06/22/22   Refill Delivery Questions    How will the patient receive the medication? Delivery Sari   When does the patient need to receive the medication? 06/22/22   Shipping Address Home   Address in [facility] confirmed and updated if neccessary? Yes   Expected Copay ($) 1122.24   Is the patient able to afford the medication copay? Yes   Payment Method  CC on file   Days supply of Refill 28   Supplies needed? No supplies needed   Refill activity completed? Yes   Refill activity plan Refill scheduled   Shipment/Pickup Date: 06/22/22          Objective    She has a past medical history of Pyoderma gangrenosa and UC (ulcerative colitis).    Tried/failed medications: infliximab    BP Readings from Last 4 Encounters:   05/20/22 116/79   04/14/22 (!) 102/59   04/02/22 111/78   04/09/21 104/65     Ht Readings from Last 4 Encounters:   04/08/22 5' 1" " "(1.549 m)   04/02/22 5' 1" (1.549 m)   04/09/21 5' 1" (1.549 m)   02/26/21 5' 1" (1.549 m)     Wt Readings from Last 4 Encounters:   05/20/22 79.5 kg (175 lb 4.3 oz)   04/08/22 78.4 kg (172 lb 13.5 oz)   04/08/22 78.4 kg (172 lb 13.5 oz)   04/02/22 72.6 kg (160 lb)     Recent Labs   Lab Result Units 04/14/22  0701 04/13/22  1721 04/13/22  0320 04/12/22  0521 04/11/22  0345 04/09/22  0338 04/08/22  1200   Creatinine mg/dL 1.4 1.7 H 1.8 H 1.8 H 1.0   < >  --    ALT U/L 16  --  20 20 12   < >  --    AST U/L 16  --  25 28 18   < >  --    Hepatitis B Surface Ag   --   --   --   --   --   --  Negative   Hep B S Ab   --   --   --   --   --   --  Positive   Hep B Core Total Ab   --   --   --   --   --   --  Negative    < > = values in this interval not displayed.     The goals of prescribed drug therapy management include:  · Supporting patient to meet the prescriber's medical treatment objectives  · Improving or maintaining quality of life  · Maintaining optimal therapy adherence  · Minimizing and managing side effects      Goals of Therapy Status: Discussed (new start)    Assessment/Plan  Patient plans to start therapy on 06/22/22      Indication, dosage, appropriateness, effectiveness, safety and convenience of her specialty medication(s) were reviewed today.     Patient Education   Patient received education on the following:    Expectations and possible outcomes of therapy   Proper use, timely administration, and missed dose management   Duration of therapy   Side effects, including prevention, minimization, and management   Contraindications and safety precautions   New or changed medications, including prescribe and over the counter medications and supplements   Reviews recommended vaccinations, as appropriate   Storage, safe handling, and disposal        Tasks added this encounter   No tasks added.   Tasks due within next 3 months   6/16/2022 - Clinical - Initial Assessment     Soco VenturaD  [facility]  [facility] [facility] [address]  Phone: [phone]  Fax: [phone]

## 2022-07-08 ENCOUNTER — TELEPHONE (OUTPATIENT)
Dept: GASTROENTEROLOGY | Facility: CLINIC | Age: 27
End: 2022-07-08
Payer: COMMERCIAL

## 2022-07-08 NOTE — TELEPHONE ENCOUNTER
----- Message from Bebeto Cherry MD sent at 7/7/2022  4:48 PM CDT -----  Thanks.  Let us get her set up for a follow-up in 2-3 months.  Let us also find out what dose of prednisone she is on so we can plan out her taper.    ----- Message -----  From: Carline Flowers, SocoD  Sent: 7/7/2022   4:39 PM CDT  To: Bebeto Cherry MD, #    Patient should have received shipment of Humira on 6/22/22. Not sure when she started as she denied education. Does not currently have a f/u with Dr. Cherry so just CHRISTIAN.

## 2022-07-13 ENCOUNTER — SPECIALTY PHARMACY (OUTPATIENT)
Dept: PHARMACY | Facility: CLINIC | Age: 27
End: 2022-07-13
Payer: COMMERCIAL

## 2022-07-13 NOTE — TELEPHONE ENCOUNTER
Specialty Pharmacy - Refill Coordination    Specialty Medication Orders Linked to Encounter    Flowsheet Row Most Recent Value   Medication #1 adalimumab 40 mg/0.4 mL PnKt (Order#[id number], Rx#[id number])          Refill Questions - Documented Responses    Flowsheet Row Most Recent Value   Patient Availability and HIPAA Verification    Does patient want to proceed with activity? Yes   HIPAA/medical authority confirmed? Yes   Relationship to patient of person spoken to? Self   Refill Screening Questions    Changes to allergies? No   Changes to medications? No   New conditions since last clinic visit? No   Unplanned office visit, urgent care, ED, or hospital admission in the last 4 weeks? No   How does patient/caregiver feel medication is working? Good   Financial problems or insurance changes? No   How many doses of your specialty medications were missed in the last 4 weeks? 0   Would patient like to speak to a pharmacist? No   When does the patient need to receive the medication? 07/20/22   Refill Delivery Questions    How will the patient receive the medication? Delivery Sari   When does the patient need to receive the medication? 07/20/22   Shipping Address Home   Address in [facility] confirmed and updated if neccessary? Yes   Expected Copay ($) 0   Is the patient able to afford the medication copay? Yes   Payment Method zero copay   Days supply of Refill 28   Supplies needed? No supplies needed   Refill activity completed? Yes   Refill activity plan Refill scheduled   Shipment/Pickup Date: 07/14/22          Current Outpatient Medications   Medication Sig    adalimumab (HUMIRA,CF, PEN CROHNS-UC-HS) 80 mg/0.8 mL PnKt Inject two of the 80mg pens (total 160mg) then 2 weeks later inject one 80mg pen under the skin.    adalimumab 40 mg/0.4 mL PnKt Inject 0.4 mLs (40 mg total) into the skin every 14 (fourteen) days.    citalopram (CELEXA) 40 MG tablet Take 40 mg by mouth once daily.    pantoprazole  (PROTONIX) 40 MG tablet Take 1 tablet (40 mg total) by mouth once daily.   Last reviewed on 6/20/2022  4:04 PM by Lorenzo Aguirre, PharmD    Review of patient's allergies indicates:   Allergen Reactions    Nickel sutures [surgical stainless steel]     Nickel Rash    Last reviewed on  6/20/2022 4:04 PM by Lorenzo Aguirre      Tasks added this encounter   8/10/2022 - Refill Call (Auto Added)   Tasks due within next 3 months   No tasks due.     [facility] Pharm[facility]  [address] [place] [address]  Phone: [phone]  Fax: [phone]

## 2022-08-10 ENCOUNTER — SPECIALTY PHARMACY (OUTPATIENT)
Dept: PHARMACY | Facility: CLINIC | Age: 27
End: 2022-08-10
Payer: COMMERCIAL

## 2022-08-10 NOTE — TELEPHONE ENCOUNTER
Specialty Pharmacy - Refill Coordination    Specialty Medication Orders Linked to Encounter    Flowsheet Row Most Recent Value   Medication #1 adalimumab (HUMIRA,CF, PEN CROHNS-UC-HS) 80 mg/0.8 mL PnKt (Order#[id number], Rx#[id number])          Refill Questions - Documented Responses    Flowsheet Row Most Recent Value   Patient Availability and HIPAA Verification    Does patient want to proceed with activity? Yes   HIPAA/medical authority confirmed? Yes   Relationship to patient of person spoken to? Self   Refill Screening Questions    Changes to allergies? No   Changes to medications? No   New conditions since last clinic visit? No   Unplanned office visit, urgent care, ED, or hospital admission in the last 4 weeks? No   How does patient/caregiver feel medication is working? Good   Financial problems or insurance changes? No   How many doses of your specialty medications were missed in the last 4 weeks? 0   Would patient like to speak to a pharmacist? No   When does the patient need to receive the medication? 08/19/22   Refill Delivery Questions    How will the patient receive the medication? Delivery Sari   When does the patient need to receive the medication? 08/19/22   Shipping Address Home   Address in [facility] confirmed and updated if neccessary? Yes   Expected Copay ($) 0   Is the patient able to afford the medication copay? Yes   Payment Method zero copay   Days supply of Refill 28   Supplies needed? No supplies needed   Refill activity completed? Yes   Refill activity plan Refill scheduled   Shipment/Pickup Date: 08/17/22          Current Outpatient Medications   Medication Sig    adalimumab 40 mg/0.4 mL PnKt Inject 0.4 mLs (40 mg total) into the skin every 14 (fourteen) days.    citalopram (CELEXA) 40 MG tablet Take 40 mg by mouth once daily.    pantoprazole (PROTONIX) 40 MG tablet Take 1 tablet (40 mg total) by mouth once daily.   Last reviewed on 6/20/2022  4:04 PM by Lorenzo Aguirre  PharmD    Review of patient's allergies indicates:   Allergen Reactions    Nickel sutures [surgical stainless steel]     Nickel Rash    Last reviewed on  6/20/2022 4:04 PM by Lorenzo Aguirre      Tasks added this encounter   9/9/2022 - Refill Call (Auto Added)   Tasks due within next 3 months   No tasks due.     [facility] - Specialty Pharmacy  [address] [place] [address]  Phone: [phone]  Fax: [phone]

## 2022-09-09 ENCOUNTER — PATIENT MESSAGE (OUTPATIENT)
Dept: PHARMACY | Facility: CLINIC | Age: 27
End: 2022-09-09
Payer: COMMERCIAL

## 2022-09-09 ENCOUNTER — SPECIALTY PHARMACY (OUTPATIENT)
Dept: PHARMACY | Facility: CLINIC | Age: 27
End: 2022-09-09
Payer: COMMERCIAL

## 2022-09-09 NOTE — TELEPHONE ENCOUNTER
Specialty Pharmacy - Refill Coordination    Specialty Medication Orders Linked to Encounter      Flowsheet Row Most Recent Value   Medication #1 adalimumab 40 mg/0.4 mL PnKt (Order#[id number], Rx#[id number])            Refill Questions - Documented Responses      Flowsheet Row Most Recent Value   Patient Availability and HIPAA Verification    Does patient want to proceed with activity? Yes   HIPAA/medical authority confirmed? Yes   Relationship to patient of person spoken to? Self   Refill Screening Questions    Changes to allergies? No   Changes to medications? No   New conditions since last clinic visit? No   Unplanned office visit, urgent care, ED, or hospital admission in the last 4 weeks? No   How does patient/caregiver feel medication is working? Excellent   Financial problems or insurance changes? No   How many doses of your specialty medications were missed in the last 4 weeks? 0   Would patient like to speak to a pharmacist? No   When does the patient need to receive the medication? 09/18/22   Refill Delivery Questions    How will the patient receive the medication? Delivery Sari   When does the patient need to receive the medication? 09/18/22   Shipping Address Home   Address in [facility] confirmed and updated if neccessary? Yes   Expected Copay ($) 0   Is the patient able to afford the medication copay? Yes   Payment Method zero copay   Days supply of Refill 28   Supplies needed? No supplies needed   Refill activity completed? Yes   Refill activity plan Refill scheduled   Shipment/Pickup Date: 09/14/22            Current Outpatient Medications   Medication Sig    adalimumab 40 mg/0.4 mL PnKt Inject 0.4 mLs (40 mg total) into the skin every 14 (fourteen) days.    citalopram (CELEXA) 40 MG tablet Take 40 mg by mouth once daily.    pantoprazole (PROTONIX) 40 MG tablet Take 1 tablet (40 mg total) by mouth once daily.   Last reviewed on 6/20/2022  4:04 PM by Lorenzo Aguirre, PharmD    Review of patient's  allergies indicates:   Allergen Reactions    Nickel sutures [surgical stainless steel]     Nickel Rash    Last reviewed on  6/20/2022 4:04 PM by Lorenzo Aguirre      Tasks added this encounter   No tasks added.   Tasks due within next 3 months   9/9/2022 - Refill Call (Auto Added)     Lorenzo, PharmD  [facility]  [address] [place], [place] [address]  [facility] [address]  Phone: [phone]  Fax: [phone]

## 2022-09-30 NOTE — PLAN OF CARE
Problem: Adult Inpatient Plan of Care  Goal: Plan of Care Review  Outcome: Ongoing, Progressing  Goal: Patient-Specific Goal (Individualized)  Outcome: Ongoing, Progressing  Goal: Absence of Hospital-Acquired Illness or Injury  Outcome: Ongoing, Progressing  Goal: Optimal Comfort and Wellbeing  Outcome: Ongoing, Progressing  Goal: Readiness for Transition of Care  Outcome: Ongoing, Progressing   VSS. Pain control. R leg dressing clean and dry. Ostomy appliance dry and intact. Call light and personal items at reach, encourage to call for assistance.   Supportive wife

## 2022-10-10 ENCOUNTER — SPECIALTY PHARMACY (OUTPATIENT)
Dept: PHARMACY | Facility: CLINIC | Age: 27
End: 2022-10-10
Payer: COMMERCIAL

## 2022-10-10 NOTE — TELEPHONE ENCOUNTER
Specialty Pharmacy - Refill Coordination    Specialty Medication Orders Linked to Encounter      Flowsheet Row Most Recent Value   Medication #1 adalimumab 40 mg/0.4 mL PnKt (Order#[id number], Rx#[id number])            Refill Questions - Documented Responses      Flowsheet Row Most Recent Value   Patient Availability and HIPAA Verification    Does patient want to proceed with activity? Yes   HIPAA/medical authority confirmed? Yes   Relationship to patient of person spoken to? Self   Refill Screening Questions    Changes to allergies? No   Changes to medications? No   New conditions since last clinic visit? No   Unplanned office visit, urgent care, ED, or hospital admission in the last 4 weeks? No   How does patient/caregiver feel medication is working? Good   Financial problems or insurance changes? No   How many doses of your specialty medications were missed in the last 4 weeks? 0   Would patient like to speak to a pharmacist? No   When does the patient need to receive the medication? 10/16/22   Refill Delivery Questions    How will the patient receive the medication? MEDRx   When does the patient need to receive the medication? 10/16/22   Shipping Address Home   Address in [facility] confirmed and updated if neccessary? Yes   Expected Copay ($) 0   Is the patient able to afford the medication copay? Yes   Payment Method zero copay   Days supply of Refill 28   Supplies needed? No supplies needed   Refill activity completed? Yes   Refill activity plan Refill scheduled   Shipment/Pickup Date: 10/12/22            Current Outpatient Medications   Medication Sig    adalimumab 40 mg/0.4 mL PnKt Inject 0.4 mLs (40 mg total) into the skin every 14 (fourteen) days.    citalopram (CELEXA) 40 MG tablet Take 40 mg by mouth once daily.    pantoprazole (PROTONIX) 40 MG tablet Take 1 tablet (40 mg total) by mouth once daily.   Last reviewed on 6/20/2022  4:04 PM by Lorenzo Aguirre, PharmD    Review of patient's allergies  indicates:   Allergen Reactions    Nickel sutures [surgical stainless steel]     Nickel Rash    Last reviewed on  6/20/2022 4:04 PM by Lorenzo Agurire      Tasks added this encounter   11/6/2022 - Refill Call (Auto Added)   Tasks due within next 3 months   No tasks due.     Chikis Gillis, PharmD  [facility]  [address] [place] [address]  Phone: [phone]  Fax: [phone]

## 2022-11-10 ENCOUNTER — SPECIALTY PHARMACY (OUTPATIENT)
Dept: PHARMACY | Facility: CLINIC | Age: 27
End: 2022-11-10
Payer: COMMERCIAL

## 2022-11-10 NOTE — TELEPHONE ENCOUNTER
Specialty Pharmacy - Refill Coordination    Specialty Medication Orders Linked to Encounter      Flowsheet Row Most Recent Value   Medication #1 adalimumab 40 mg/0.4 mL PnKt (Order#[id number], Rx#[id number])          Patient dose due on 11/13, but due to shipping patient will not receive until 11/14. Advised she inject at that time and can go back to her normal Sunday schedule in 2 weeks. Patient verbalized understanding.     Refill Questions - Documented Responses      Flowsheet Row Most Recent Value   Patient Availability and HIPAA Verification    Does patient want to proceed with activity? Yes   HIPAA/medical authority confirmed? Yes   Relationship to patient of person spoken to? Self   Refill Screening Questions    Changes to allergies? No   Changes to medications? No   New conditions since last clinic visit? No   Unplanned office visit, urgent care, ED, or hospital admission in the last 4 weeks? No   How does patient/caregiver feel medication is working? Good   Financial problems or insurance changes? No   How many doses of your specialty medications were missed in the last 4 weeks? 0   Would patient like to speak to a pharmacist? No   When does the patient need to receive the medication? 11/14/22   Refill Delivery Questions    How will the patient receive the medication? MEDRx   When does the patient need to receive the medication? 11/14/22   Shipping Address Home   Address in [facility] confirmed and updated if neccessary? Yes   Expected Copay ($) 0   Is the patient able to afford the medication copay? Yes   Payment Method zero copay   Days supply of Refill 28   Supplies needed? Injection Device   Refill activity completed? Yes   Refill activity plan Refill scheduled   Shipment/Pickup Date: 11/11/22            Current Outpatient Medications   Medication Sig    adalimumab 40 mg/0.4 mL PnKt Inject 0.4 mLs (40 mg total) into the skin every 14 (fourteen) days.    citalopram (CELEXA) 40 MG tablet Take 40 mg  by mouth once daily.    pantoprazole (PROTONIX) 40 MG tablet Take 1 tablet (40 mg total) by mouth once daily.   Last reviewed on 6/20/2022  4:04 PM by Lorenzo Aguirre, PharmCHASE    Review of patient's allergies indicates:   Allergen Reactions    Nickel sutures [surgical stainless steel]     Nickel Rash    Last reviewed on  6/20/2022 4:04 PM by Lorenzo Aguirre      Tasks added this encounter   12/5/2022 - Refill Call (Auto Added)   Tasks due within next 3 months   No tasks due.     Earl Mchugh, PharmD  [facility]  [address] [facility] [address]  Phone: [phone]  Fax: [phone]

## 2022-11-14 ENCOUNTER — TELEPHONE (OUTPATIENT)
Dept: FAMILY MEDICINE | Facility: CLINIC | Age: 27
End: 2022-11-14
Payer: COMMERCIAL

## 2022-11-14 ENCOUNTER — OFFICE VISIT (OUTPATIENT)
Dept: FAMILY MEDICINE | Facility: CLINIC | Age: 27
End: 2022-11-14
Payer: COMMERCIAL

## 2022-11-14 ENCOUNTER — PATIENT MESSAGE (OUTPATIENT)
Dept: FAMILY MEDICINE | Facility: CLINIC | Age: 27
End: 2022-11-14
Payer: COMMERCIAL

## 2022-11-14 DIAGNOSIS — F32.4 MAJOR DEPRESSIVE DISORDER IN PARTIAL REMISSION, UNSPECIFIED WHETHER RECURRENT: ICD-10-CM

## 2022-11-14 DIAGNOSIS — Z00.00 GENERAL MEDICAL EXAM: Primary | ICD-10-CM

## 2022-11-14 DIAGNOSIS — K51.018 ULCERATIVE PANCOLITIS WITH OTHER COMPLICATION: ICD-10-CM

## 2022-11-14 PROCEDURE — 99203 OFFICE O/P NEW LOW 30 MIN: CPT | Mod: 95,,, | Performed by: FAMILY MEDICINE

## 2022-11-14 PROCEDURE — 1160F RVW MEDS BY RX/DR IN RCRD: CPT | Mod: CPTII,95,, | Performed by: FAMILY MEDICINE

## 2022-11-14 PROCEDURE — 1160F PR REVIEW ALL MEDS BY PRESCRIBER/CLIN PHARMACIST DOCUMENTED: ICD-10-PCS | Mod: CPTII,95,, | Performed by: FAMILY MEDICINE

## 2022-11-14 PROCEDURE — 1159F MED LIST DOCD IN RCRD: CPT | Mod: CPTII,95,, | Performed by: FAMILY MEDICINE

## 2022-11-14 PROCEDURE — 99203 PR OFFICE/OUTPT VISIT, NEW, LEVL III, 30-44 MIN: ICD-10-PCS | Mod: 95,,, | Performed by: FAMILY MEDICINE

## 2022-11-14 PROCEDURE — 1159F PR MEDICATION LIST DOCUMENTED IN MEDICAL RECORD: ICD-10-PCS | Mod: CPTII,95,, | Performed by: FAMILY MEDICINE

## 2022-11-14 RX ORDER — CITALOPRAM 40 MG/1
40 TABLET, FILM COATED ORAL DAILY
Qty: 90 TABLET | Refills: 3 | Status: SHIPPED | OUTPATIENT
Start: 2022-11-14

## 2022-11-14 NOTE — PROGRESS NOTES
Telemedicine Office Visit    Lorelei Rodriguez    1995  [id number]    Subjective     The patient location is: home   The chief complaint leading to consultation is: medication refill   Visit type: Virtual visit with synchronous audio and video  Total time spent with patient: 15 minutes   Each patient to whom he or she provides medical services by telemedicine is:  (1) informed of the relationship between the physician and patient and the respective role of any other health care provider with respect to management of the patient; and (2) notified that he or she may decline to receive medical services by telemedicine and may withdraw from such care at any time.    Lorelei is a 27 y.o. female who presents through telemedicine for:     Establish care / new to me - Patient will come into office to establish care   Weight - Patient concerned about weight. Scheduled for appt   Depression / anxiety - Patient has ulcerative colitis and history of pyoderma gangrenosum. Symptoms are currently being managed by humira. Patient feels that is controlled but still has some depression that is controlled by citalopram. She is not risk to herself or others. Well controlled on current regimen. No side effects     Objective       Answers submitted by the patient for this visit:  Review of Systems Questionnaire (Submitted on 11/14/2022)  activity change: No  unexpected weight change: No  rhinorrhea: No  trouble swallowing: No  visual disturbance: No  chest tightness: No  polyuria: No  difficulty urinating: No  menstrual problem: No  joint swelling: No  arthralgias: No  confusion: No  dysphoric mood: No    Review of Systems   Constitutional:  Negative for chills and fever.   HENT:  Negative for congestion and hearing loss.    Eyes:  Negative for blurred vision and discharge.   Respiratory:  Negative for cough and wheezing.    Cardiovascular:  Negative for chest pain and palpitations.   Gastrointestinal:  Negative for abdominal pain, blood in  stool, constipation, diarrhea, heartburn, nausea and vomiting.   Genitourinary:  Negative for dysuria and hematuria.   Musculoskeletal:  Negative for myalgias and neck pain.   Skin:  Negative for itching and rash.   Neurological:  Negative for dizziness, weakness and headaches.   Endo/Heme/Allergies:  Negative for polydipsia.   Psychiatric/Behavioral:  Negative for depression.      There were no vitals taken for this visit.  Physical Exam  Constitutional:       Appearance: She is well-developed.   HENT:      Head: Normocephalic and atraumatic.   Eyes:      Conjunctiva/sclera: Conjunctivae normal.      Pupils: Pupils are equal, round, and reactive to light.   Cardiovascular:      Rate and Rhythm: Normal rate and regular rhythm.      Heart sounds: Normal heart sounds. No murmur heard.    No friction rub. No gallop.   Pulmonary:      Effort: No respiratory distress.      Breath sounds: Normal breath sounds.   Abdominal:      General: Bowel sounds are normal. There is no distension.      Palpations: Abdomen is soft.      Tenderness: There is no abdominal tenderness.   Musculoskeletal:         General: Normal range of motion.      Cervical back: Normal range of motion and neck supple.   Lymphadenopathy:      Cervical: No cervical adenopathy.   Skin:     General: Skin is warm.   Neurological:      Mental Status: She is alert and oriented to person, place, and time.         Plan     Problem List Items Addressed This Visit          Psychiatric    Major depressive disorder in partial remission    Relevant Medications    citalopram (CELEXA) 40 MG tablet  The current medical regimen is effective;  continue present plan and medications.         GI    Ulcerative colitis  The current medical regimen is effective;  continue present plan and medications.   On humira  Continue f/u with GI        Other Visit Diagnoses       General medical exam    -  Primary    Relevant Orders    CBC Auto Differential    Comprehensive Metabolic Panel     Lipid Panel    Hemoglobin A1C    TSH    HIV 1/2 Ag/Ab (4th Gen)    Ambulatory referral/consult to Obstetrics / Gynecology  I addressed all major concerns as it related to health maintenance.  All were ordered and scheduled based on the patients wishes.  Any additional health maintenance will be readdressed at the next physical if declined or deferred by the patient.                 Follow up in about 6 months (around 5/14/2023), or if symptoms worsen or fail to improve.

## 2022-11-14 NOTE — TELEPHONE ENCOUNTER
Called patient to inform that the virtual appt she scheduled through her portal was not appropriate as Dr Moore does not do virtuals for new weight loss patients, lvm to call to reschedule, and informed of cancellation.

## 2022-11-14 NOTE — TELEPHONE ENCOUNTER
Please contact Patient   New weight loss visit cannot be scheduled virtually   Please reschedule

## 2022-12-05 ENCOUNTER — SPECIALTY PHARMACY (OUTPATIENT)
Dept: PHARMACY | Facility: CLINIC | Age: 27
End: 2022-12-05
Payer: COMMERCIAL

## 2022-12-05 NOTE — TELEPHONE ENCOUNTER
Specialty Pharmacy - Refill Coordination    Specialty Medication Orders Linked to Encounter      Flowsheet Row Most Recent Value   Medication #1 adalimumab (HUMIRA,[facility], [facility]) 80 mg/0.8 mL PnKt (Order#[id number], Rx#[id number])            Refill Questions - Documented Responses      Flowsheet Row Most Recent Value   Patient Availability and HIPAA Verification    Does patient want to proceed with activity? Yes   HIPAA/medical authority confirmed? Yes   Relationship to patient of person spoken to? Self   Refill Screening Questions    Changes to allergies? No   Changes to medications? No   New conditions since last clinic visit? No   Unplanned office visit, urgent care, ED, or hospital admission in the last 4 weeks? No   How does patient/caregiver feel medication is working? Good   Financial problems or insurance changes? No   How many doses of your specialty medications were missed in the last 4 weeks? 0   Would patient like to speak to a pharmacist? No   When does the patient need to receive the medication? 12/15/22   Refill Delivery Questions    How will the patient receive the medication? [facility]   When does the patient need to receive the medication? 12/15/22   Shipping Address Home   Address in [facility] confirmed and updated if neccessary? Yes   Expected Copay ($) 0   Is the patient able to afford the medication copay? Yes   Payment Method zero copay   Days supply of Refill 28   Supplies needed? No supplies needed   Refill activity completed? Yes   Refill activity plan Refill scheduled   Shipment/Pickup Date: 12/13/22            Current Outpatient Medications   Medication Sig    adalimumab 40 mg/0.4 mL PnKt Inject 0.4 mLs (40 mg total) into the skin every 14 (fourteen) days.    citalopram (CELEXA) 40 MG tablet Take 1 tablet (40 mg total) by mouth once daily.   Last reviewed on 11/14/2022  1:03 PM by Alpa Moore MD    Review of patient's allergies indicates:   Allergen Reactions    Nickel sutures  [surgical stainless steel]     Nickel Rash    Last reviewed on  11/14/2022 1:03 PM by Alpa Moore      Tasks added this encounter   1/5/2023 - Refill Call (Auto Added)   Tasks due within next 3 months   No tasks due.     [facility]  [address] [facility] [address]  Phone: [phone]  Fax: [phone]

## 2022-12-14 NOTE — ASSESSMENT & PLAN NOTE
Patient was referred to gyne due to the abnormal pelvic ultrasound results. Gyne had recommended labs and a surgical consult. Please advise her to follow those recommendations and that provider will determine whether she needs another pelvic US or not. Thanks. This patient does have evidence of infective focus  My overall impression is septic shock.  Source:  Right lower extremity cellulitis  Antibiotics given-   Antibiotics (From admission, onward)            Piperacillin/tazobactam and vancomycin        Latest lactate reviewed-  No results for input(s): LACTATE in the last 72 hours.  Organ dysfunction indicated by Lactic acidosis    Shock with decreased perfusion noted, Fluid challenge  was given at 30cc/kg    Post- resuscitation assessment- (Done after fluids given for shock)  Vital signs post fluid administrations were-  Temp Readings from Last 1 Encounters:   04/07/22 98.8 °F (37.1 °C) (Oral)     BP Readings from Last 1 Encounters:   04/07/22 (!) 87/48     Pulse Readings from Last 1 Encounters:   04/07/22 93       Perfusion exam was performed within 6 hours of septic shock presentation after bolus shows Adequate tissue perfusion assessed by non-invasive monitoring  Will Start Pressors- Levophed for MAP of 65  Source control achieved by:

## 2023-01-05 DIAGNOSIS — K51.018 ULCERATIVE PANCOLITIS WITH OTHER COMPLICATION: ICD-10-CM

## 2023-01-05 RX ORDER — ADALIMUMAB 40MG/0.4ML
40 KIT SUBCUTANEOUS
Qty: 2 PEN | Refills: 0 | OUTPATIENT
Start: 2023-01-05 | End: 2023-06-22

## 2023-01-05 NOTE — TELEPHONE ENCOUNTER
Refill request for Humira    Allergies reviewed     Drug Monitoring labs:  CBC/CMP q 6 months                                        TB Quantiferon yearly                                         HBsAg/ HBcAb yearly    Lab Results   Component Value Date    HEPBSAG Negative 04/08/2022    HEPBCAB Negative 04/08/2022     Lab Results   Component Value Date    TBGOLDPLUS Negative 04/08/2022     No results found for: [id number], [id number]  Lab Results   Component Value Date    WBC 16.97 (H) 04/14/2022    HGB 12.3 04/14/2022    HCT 36.0 (L) 04/14/2022    MCV 81 (L) 04/14/2022     04/14/2022     Lab Results   Component Value Date    CREATININE 1.4 04/14/2022    ALBUMIN 2.5 (L) 04/14/2022    BILITOT 0.2 04/14/2022    ALKPHOS 78 04/14/2022    AST 16 04/14/2022    ALT 16 04/14/2022       Labs due:  Overdue scheduled 1/17/2023    Next appt: 2/15/2023     RX pended

## 2023-01-05 NOTE — TELEPHONE ENCOUNTER
Dr. Cherry refused to refill asked that patient reach out to her provider  -called patient notified of need to follow up with her other provider for the refill and importance of following up with all her providers as medication has to be monitored along with labs   -patient voiced understanding  -patient requesting Humira refill ,called and spoke to her  -ordered labs and set up appointment   -set follow up appointment with Dr. Cherry 2/15/2023  -Pt had cancelled 3 mo f/u 10/4/22, and her first scheduled NP visit ,has cancelled multiple appointments with Dr. Johnson and her GI doctor Earl , was started on Humira 5/30/22 no labs done since, scheduled labs 1/17/2023, appointment scheduled with importance of appointment to be able to continue to receive treatment and refills